# Patient Record
Sex: FEMALE | Race: WHITE | NOT HISPANIC OR LATINO | URBAN - METROPOLITAN AREA
[De-identification: names, ages, dates, MRNs, and addresses within clinical notes are randomized per-mention and may not be internally consistent; named-entity substitution may affect disease eponyms.]

---

## 2017-03-01 ENCOUNTER — EMERGENCY (EMERGENCY)
Facility: HOSPITAL | Age: 82
LOS: 1 days | End: 2017-03-01
Admitting: EMERGENCY MEDICINE
Payer: MEDICARE

## 2017-03-01 LAB
APPEARANCE UR: CLEAR — SIGNIFICANT CHANGE UP
BACTERIA # UR AUTO: ABNORMAL
BILIRUB UR-MCNC: NEGATIVE — SIGNIFICANT CHANGE UP
COD CRY URNS QL: ABNORMAL
COLOR SPEC: YELLOW — SIGNIFICANT CHANGE UP
DIFF PNL FLD: ABNORMAL
EPI CELLS # UR: SIGNIFICANT CHANGE UP
GLUCOSE UR QL: NEGATIVE MG/DL — SIGNIFICANT CHANGE UP
KETONES UR-MCNC: NEGATIVE — SIGNIFICANT CHANGE UP
LEUKOCYTE ESTERASE UR-ACNC: ABNORMAL
NITRITE UR-MCNC: NEGATIVE — SIGNIFICANT CHANGE UP
PH UR: 5 — SIGNIFICANT CHANGE UP (ref 4.8–8)
PROT UR-MCNC: 100 MG/DL
RBC CASTS # UR COMP ASSIST: ABNORMAL /HPF (ref 0–4)
SP GR SPEC: 1.02 — SIGNIFICANT CHANGE UP (ref 1.01–1.02)
UROBILINOGEN FLD QL: 1 MG/DL
WBC UR QL: ABNORMAL

## 2017-03-01 PROCEDURE — 99283 EMERGENCY DEPT VISIT LOW MDM: CPT

## 2017-03-01 PROCEDURE — 81001 URINALYSIS AUTO W/SCOPE: CPT

## 2017-03-01 RX ORDER — PHENAZOPYRIDINE HCL 100 MG
1 TABLET ORAL
Qty: 6 | Refills: 0 | OUTPATIENT
Start: 2017-03-01 | End: 2017-03-03

## 2017-03-01 RX ORDER — MOXIFLOXACIN HYDROCHLORIDE TABLETS, 400 MG 400 MG/1
1 TABLET, FILM COATED ORAL
Qty: 14 | Refills: 0 | OUTPATIENT
Start: 2017-03-01 | End: 2017-03-08

## 2017-06-14 ENCOUNTER — EMERGENCY (EMERGENCY)
Facility: HOSPITAL | Age: 82
LOS: 1 days | End: 2017-06-14
Attending: EMERGENCY MEDICINE | Admitting: EMERGENCY MEDICINE

## 2017-06-14 VITALS
OXYGEN SATURATION: 96 % | DIASTOLIC BLOOD PRESSURE: 65 MMHG | RESPIRATION RATE: 16 BRPM | SYSTOLIC BLOOD PRESSURE: 110 MMHG | TEMPERATURE: 98 F | HEIGHT: 64 IN | HEART RATE: 88 BPM

## 2017-06-14 PROCEDURE — 99283 EMERGENCY DEPT VISIT LOW MDM: CPT

## 2017-06-14 RX ORDER — ACETAMINOPHEN 500 MG
650 TABLET ORAL ONCE
Qty: 0 | Refills: 0 | Status: COMPLETED | OUTPATIENT
Start: 2017-06-14 | End: 2017-06-14

## 2017-06-14 RX ADMIN — Medication 300 MILLIGRAM(S): at 06:20

## 2017-06-14 RX ADMIN — Medication 650 MILLIGRAM(S): at 06:19

## 2017-06-14 NOTE — ED ADULT NURSE NOTE - OBJECTIVE STATEMENT
c/o pain on the chin x2days with  no radiation. denies fever/chills or swallowing difficulty.mild swelling noted.

## 2017-06-14 NOTE — ED PROVIDER NOTE - MEDICAL DECISION MAKING DETAILS
87 y.o. F with 3 days worsening pain in lower teeth/chin - has dental abscess - will start antibiotics - advised must see dentist/oral surgeon today - has dentist though hasn't been in years - will give referrals for on call oral surgeon and [facility]

## 2017-06-14 NOTE — ED PROVIDER NOTE - PMH
Cardiomyopathy    COPD (Chronic Obstructive Pulmonary Disease)    Herniation of Cervical Intervertebral Disc    Pneumonia    Syncope

## 2017-06-14 NOTE — ED PROVIDER NOTE - OBJECTIVE STATEMENT
87 y.o. F having pain in chin, worsening for 3 days, not sure if dental or in gums, took ibuprofen without relief, hasn't been to dentist in years, thinks they would be able to see her today, no fever, no other symptoms

## 2017-06-14 NOTE — ED PROVIDER NOTE - MOUTH
+gingivitis, large amount plaque, abscess in space between anterior chin and mid mandible - tender and swollen/GINGIVAL ABSCESS/GINGIVAL EDEMA

## 2017-06-16 ENCOUNTER — EMERGENCY (EMERGENCY)
Facility: HOSPITAL | Age: 82
LOS: 1 days | Discharge: HOME | End: 2017-06-16
Attending: EMERGENCY MEDICINE | Admitting: EMERGENCY MEDICINE
Payer: MEDICARE

## 2017-06-16 VITALS
HEART RATE: 82 BPM | SYSTOLIC BLOOD PRESSURE: 122 MMHG | RESPIRATION RATE: 16 BRPM | OXYGEN SATURATION: 96 % | DIASTOLIC BLOOD PRESSURE: 60 MMHG | TEMPERATURE: 98 F

## 2017-06-16 VITALS
DIASTOLIC BLOOD PRESSURE: 74 MMHG | OXYGEN SATURATION: 96 % | HEIGHT: 64 IN | HEART RATE: 81 BPM | WEIGHT: 84 LBS | TEMPERATURE: 98 F | RESPIRATION RATE: 16 BRPM | SYSTOLIC BLOOD PRESSURE: 122 MMHG

## 2017-06-16 LAB
ALBUMIN SERPL ELPH-MCNC: 3.5 G/DL — SIGNIFICANT CHANGE UP (ref 3.3–5)
ALP SERPL-CCNC: 122 U/L — HIGH (ref 30–120)
ALT FLD-CCNC: 11 U/L DA — SIGNIFICANT CHANGE UP (ref 10–60)
AMYLASE P1 CFR SERPL: 116 U/L — SIGNIFICANT CHANGE UP (ref 25–125)
ANION GAP SERPL CALC-SCNC: 4 MMOL/L — LOW (ref 5–17)
APPEARANCE UR: CLEAR — SIGNIFICANT CHANGE UP
APTT BLD: 31.1 SEC — SIGNIFICANT CHANGE UP (ref 27.5–37.4)
AST SERPL-CCNC: 17 U/L — SIGNIFICANT CHANGE UP (ref 10–40)
BACTERIA # UR AUTO: ABNORMAL
BASOPHILS # BLD AUTO: 0.2 K/UL — SIGNIFICANT CHANGE UP (ref 0–0.2)
BASOPHILS NFR BLD AUTO: 2.1 % — HIGH (ref 0–2)
BILIRUB SERPL-MCNC: 0.5 MG/DL — SIGNIFICANT CHANGE UP (ref 0.2–1.2)
BILIRUB UR-MCNC: NEGATIVE — SIGNIFICANT CHANGE UP
BUN SERPL-MCNC: 21 MG/DL — SIGNIFICANT CHANGE UP (ref 7–23)
CALCIUM SERPL-MCNC: 9.9 MG/DL — SIGNIFICANT CHANGE UP (ref 8.4–10.5)
CHLORIDE SERPL-SCNC: 105 MMOL/L — SIGNIFICANT CHANGE UP (ref 96–108)
CK MB CFR SERPL CALC: 0.5 NG/ML — SIGNIFICANT CHANGE UP (ref 0–3.6)
CO2 SERPL-SCNC: 34 MMOL/L — HIGH (ref 22–31)
COD CRY URNS QL: ABNORMAL
COLOR SPEC: YELLOW — SIGNIFICANT CHANGE UP
CREAT SERPL-MCNC: 0.84 MG/DL — SIGNIFICANT CHANGE UP (ref 0.5–1.3)
DIFF PNL FLD: ABNORMAL
EOSINOPHIL # BLD AUTO: 0.2 K/UL — SIGNIFICANT CHANGE UP (ref 0–0.5)
EOSINOPHIL NFR BLD AUTO: 2.8 % — SIGNIFICANT CHANGE UP (ref 0–6)
EPI CELLS # UR: SIGNIFICANT CHANGE UP
GLUCOSE SERPL-MCNC: 153 MG/DL — HIGH (ref 70–99)
GLUCOSE UR QL: NEGATIVE MG/DL — SIGNIFICANT CHANGE UP
HCT VFR BLD CALC: 41.3 % — SIGNIFICANT CHANGE UP (ref 34.5–45)
HGB BLD-MCNC: 13.8 G/DL — SIGNIFICANT CHANGE UP (ref 11.5–15.5)
INR BLD: 1.05 RATIO — SIGNIFICANT CHANGE UP (ref 0.88–1.16)
KETONES UR-MCNC: NEGATIVE — SIGNIFICANT CHANGE UP
LEUKOCYTE ESTERASE UR-ACNC: ABNORMAL
LIDOCAIN IGE QN: 191 U/L — SIGNIFICANT CHANGE UP (ref 73–393)
LYMPHOCYTES # BLD AUTO: 1.2 K/UL — SIGNIFICANT CHANGE UP (ref 1–3.3)
LYMPHOCYTES # BLD AUTO: 15.1 % — SIGNIFICANT CHANGE UP (ref 13–44)
MCHC RBC-ENTMCNC: 31 PG — SIGNIFICANT CHANGE UP (ref 27–34)
MCHC RBC-ENTMCNC: 33.5 GM/DL — SIGNIFICANT CHANGE UP (ref 32–36)
MCV RBC AUTO: 92.8 FL — SIGNIFICANT CHANGE UP (ref 80–100)
MONOCYTES # BLD AUTO: 1 K/UL — HIGH (ref 0–0.9)
MONOCYTES NFR BLD AUTO: 11.9 % — SIGNIFICANT CHANGE UP (ref 2–14)
NEUTROPHILS # BLD AUTO: 5.6 K/UL — SIGNIFICANT CHANGE UP (ref 1.8–7.4)
NEUTROPHILS NFR BLD AUTO: 68.1 % — SIGNIFICANT CHANGE UP (ref 43–77)
NITRITE UR-MCNC: POSITIVE
PH UR: 5 — SIGNIFICANT CHANGE UP (ref 5–8)
PLATELET # BLD AUTO: 314 K/UL — SIGNIFICANT CHANGE UP (ref 150–400)
POTASSIUM SERPL-MCNC: 4.6 MMOL/L — SIGNIFICANT CHANGE UP (ref 3.5–5.3)
POTASSIUM SERPL-SCNC: 4.6 MMOL/L — SIGNIFICANT CHANGE UP (ref 3.5–5.3)
PROT SERPL-MCNC: 8.3 G/DL — SIGNIFICANT CHANGE UP (ref 6–8.3)
PROT UR-MCNC: 500 MG/DL
PROTHROM AB SERPL-ACNC: 11.5 SEC — SIGNIFICANT CHANGE UP (ref 9.8–12.7)
RBC # BLD: 4.46 M/UL — SIGNIFICANT CHANGE UP (ref 3.8–5.2)
RBC # FLD: 12.8 % — SIGNIFICANT CHANGE UP (ref 10.3–14.5)
RBC CASTS # UR COMP ASSIST: ABNORMAL /HPF (ref 0–4)
SODIUM SERPL-SCNC: 143 MMOL/L — SIGNIFICANT CHANGE UP (ref 135–145)
SP GR SPEC: 1.03 — HIGH (ref 1.01–1.02)
TROPONIN I SERPL-MCNC: 0 NG/ML — LOW (ref 0.02–0.06)
UROBILINOGEN FLD QL: 1 MG/DL
WBC # BLD: 8.2 K/UL — SIGNIFICANT CHANGE UP (ref 3.8–10.5)
WBC # FLD AUTO: 8.2 K/UL — SIGNIFICANT CHANGE UP (ref 3.8–10.5)
WBC UR QL: SIGNIFICANT CHANGE UP

## 2017-06-16 PROCEDURE — 74020: CPT | Mod: 26

## 2017-06-16 PROCEDURE — 93010 ELECTROCARDIOGRAM REPORT: CPT

## 2017-06-16 PROCEDURE — 71010: CPT | Mod: 26

## 2017-06-16 PROCEDURE — 74176 CT ABD & PELVIS W/O CONTRAST: CPT | Mod: 26

## 2017-06-16 PROCEDURE — 99284 EMERGENCY DEPT VISIT MOD MDM: CPT

## 2017-06-16 RX ORDER — MORPHINE SULFATE 50 MG/1
2 CAPSULE, EXTENDED RELEASE ORAL ONCE
Qty: 0 | Refills: 0 | Status: DISCONTINUED | OUTPATIENT
Start: 2017-06-16 | End: 2017-06-16

## 2017-06-16 RX ORDER — PANTOPRAZOLE SODIUM 20 MG/1
40 TABLET, DELAYED RELEASE ORAL ONCE
Qty: 0 | Refills: 0 | Status: COMPLETED | OUTPATIENT
Start: 2017-06-16 | End: 2017-06-16

## 2017-06-16 RX ORDER — SODIUM CHLORIDE 9 MG/ML
1000 INJECTION INTRAMUSCULAR; INTRAVENOUS; SUBCUTANEOUS ONCE
Qty: 0 | Refills: 0 | Status: COMPLETED | OUTPATIENT
Start: 2017-06-16 | End: 2017-06-16

## 2017-06-16 RX ADMIN — MORPHINE SULFATE 2 MILLIGRAM(S): 50 CAPSULE, EXTENDED RELEASE ORAL at 18:35

## 2017-06-16 RX ADMIN — PANTOPRAZOLE SODIUM 40 MILLIGRAM(S): 20 TABLET, DELAYED RELEASE ORAL at 17:43

## 2017-06-16 RX ADMIN — MORPHINE SULFATE 2 MILLIGRAM(S): 50 CAPSULE, EXTENDED RELEASE ORAL at 20:33

## 2017-06-16 RX ADMIN — MORPHINE SULFATE 2 MILLIGRAM(S): 50 CAPSULE, EXTENDED RELEASE ORAL at 19:04

## 2017-06-16 RX ADMIN — SODIUM CHLORIDE 1000 MILLILITER(S): 9 INJECTION INTRAMUSCULAR; INTRAVENOUS; SUBCUTANEOUS at 17:30

## 2017-06-16 NOTE — ED PROVIDER NOTE - PROGRESS NOTE DETAILS
Pain improved. Hematuria noted on UA. ? renal colic? Plan - ct [place]. Reevaluated patient at bedside.  Patient feeling well, wants to be d/c to f.u as outpt.  Discussed the results of all diagnostic testing in ED and copies of all reports given.   An opportunity to ask questions was given.  Discussed the importance of prompt, close medical follow-up.  Patient will return with any changes, concerns or persistent / worsening symptoms.  Understanding of all instructions verbalized. D/W gallito (uro) he requests d/c to f.u in office

## 2017-06-16 NOTE — ED ADULT NURSE NOTE - ED STAT RN HANDOFF DETAILS
Endorse pt to next shift RN. Awaiting reevaluation and disposition. IVF wide open. Meds given as ordered. Family at the bedside.

## 2017-06-16 NOTE — ED PROVIDER NOTE - MEDICAL DECISION MAKING DETAILS
88 y/o F w/ epigastric pain after taking Clindamycin, questionable gastritis vs. other intraabdominal pathology. Will check blood work, X-rays and possible CT scan.

## 2017-06-16 NOTE — ED PROVIDER NOTE - OBJECTIVE STATEMENT
86 y/o F seen in ER this week for tooth ache and was started on Clindamycin. Pt developed epigastric pain radiating to back this morning. She is unsure if it is from medication or food that she ate. Pt denies fever, cough, vomiting, diarrhea. PMHx of COPD. Pt has not seen an MD in years.

## 2017-06-16 NOTE — ED PROVIDER NOTE - CONDUCTED A DETAILED DISCUSSION WITH PATIENT AND/OR GUARDIAN REGARDING, MDM
lab results/radiology results need for outpatient follow-up/radiology results/return to ED if symptoms worsen, persist or questions arise/lab results

## 2017-06-16 NOTE — ED PROVIDER NOTE - CONSTITUTIONAL, MLM
normal... awake, alert, oriented to person, place, time/situation and in moderate distress. Holding abdomen.

## 2017-06-16 NOTE — ED ADULT NURSE REASSESSMENT NOTE - NS ED NURSE REASSESS COMMENT FT1
Ambulatory to bathroom w/out distress. Currently pain 2-3/10. Comes intermittently per pt. IV D/C'd per MD. Awaiting D/C instructions.

## 2017-06-17 ENCOUNTER — INPATIENT (INPATIENT)
Facility: HOSPITAL | Age: 82
LOS: 1 days | Discharge: HOME | DRG: 392 | End: 2017-06-19
Attending: INTERNAL MEDICINE | Admitting: INTERNAL MEDICINE
Payer: MEDICARE

## 2017-06-17 VITALS
SYSTOLIC BLOOD PRESSURE: 146 MMHG | TEMPERATURE: 98 F | DIASTOLIC BLOOD PRESSURE: 67 MMHG | RESPIRATION RATE: 20 BRPM | HEART RATE: 74 BPM | OXYGEN SATURATION: 96 % | WEIGHT: 91.27 LBS | HEIGHT: 64 IN

## 2017-06-17 DIAGNOSIS — R10.13 EPIGASTRIC PAIN: ICD-10-CM

## 2017-06-17 LAB
ALBUMIN SERPL ELPH-MCNC: 3.1 G/DL — LOW (ref 3.3–5)
ALP SERPL-CCNC: 109 U/L — SIGNIFICANT CHANGE UP (ref 30–120)
ALT FLD-CCNC: 10 U/L DA — SIGNIFICANT CHANGE UP (ref 10–60)
AMYLASE P1 CFR SERPL: 96 U/L — SIGNIFICANT CHANGE UP (ref 25–125)
ANION GAP SERPL CALC-SCNC: 3 MMOL/L — LOW (ref 5–17)
APPEARANCE UR: CLEAR — SIGNIFICANT CHANGE UP
AST SERPL-CCNC: 24 U/L — SIGNIFICANT CHANGE UP (ref 10–40)
BACTERIA # UR AUTO: ABNORMAL
BASOPHILS # BLD AUTO: 0.1 K/UL — SIGNIFICANT CHANGE UP (ref 0–0.2)
BASOPHILS NFR BLD AUTO: 1.3 % — SIGNIFICANT CHANGE UP (ref 0–2)
BILIRUB SERPL-MCNC: 0.6 MG/DL — SIGNIFICANT CHANGE UP (ref 0.2–1.2)
BILIRUB UR-MCNC: NEGATIVE — SIGNIFICANT CHANGE UP
BUN SERPL-MCNC: 17 MG/DL — SIGNIFICANT CHANGE UP (ref 7–23)
CALCIUM SERPL-MCNC: 9.5 MG/DL — SIGNIFICANT CHANGE UP (ref 8.4–10.5)
CHLORIDE SERPL-SCNC: 105 MMOL/L — SIGNIFICANT CHANGE UP (ref 96–108)
CO2 SERPL-SCNC: 33 MMOL/L — HIGH (ref 22–31)
COLOR SPEC: YELLOW — SIGNIFICANT CHANGE UP
CREAT SERPL-MCNC: 0.65 MG/DL — SIGNIFICANT CHANGE UP (ref 0.5–1.3)
DIFF PNL FLD: ABNORMAL
EOSINOPHIL # BLD AUTO: 0.2 K/UL — SIGNIFICANT CHANGE UP (ref 0–0.5)
EOSINOPHIL NFR BLD AUTO: 2.6 % — SIGNIFICANT CHANGE UP (ref 0–6)
EPI CELLS # UR: SIGNIFICANT CHANGE UP
GLUCOSE SERPL-MCNC: 110 MG/DL — HIGH (ref 70–99)
GLUCOSE UR QL: NEGATIVE MG/DL — SIGNIFICANT CHANGE UP
HCT VFR BLD CALC: 40.9 % — SIGNIFICANT CHANGE UP (ref 34.5–45)
HGB BLD-MCNC: 12.8 G/DL — SIGNIFICANT CHANGE UP (ref 11.5–15.5)
KETONES UR-MCNC: NEGATIVE — SIGNIFICANT CHANGE UP
LEUKOCYTE ESTERASE UR-ACNC: ABNORMAL
LIDOCAIN IGE QN: 125 U/L — SIGNIFICANT CHANGE UP (ref 73–393)
LYMPHOCYTES # BLD AUTO: 0.8 K/UL — LOW (ref 1–3.3)
LYMPHOCYTES # BLD AUTO: 9.9 % — LOW (ref 13–44)
MCHC RBC-ENTMCNC: 29.2 PG — SIGNIFICANT CHANGE UP (ref 27–34)
MCHC RBC-ENTMCNC: 31.2 GM/DL — LOW (ref 32–36)
MCV RBC AUTO: 93.7 FL — SIGNIFICANT CHANGE UP (ref 80–100)
MONOCYTES # BLD AUTO: 0.8 K/UL — SIGNIFICANT CHANGE UP (ref 0–0.9)
MONOCYTES NFR BLD AUTO: 9.8 % — SIGNIFICANT CHANGE UP (ref 2–14)
NEUTROPHILS # BLD AUTO: 6.4 K/UL — SIGNIFICANT CHANGE UP (ref 1.8–7.4)
NEUTROPHILS NFR BLD AUTO: 76.3 % — SIGNIFICANT CHANGE UP (ref 43–77)
NITRITE UR-MCNC: NEGATIVE — SIGNIFICANT CHANGE UP
PH UR: 5 — SIGNIFICANT CHANGE UP (ref 5–8)
PLATELET # BLD AUTO: 267 K/UL — SIGNIFICANT CHANGE UP (ref 150–400)
POTASSIUM SERPL-MCNC: 4.4 MMOL/L — SIGNIFICANT CHANGE UP (ref 3.5–5.3)
POTASSIUM SERPL-SCNC: 4.4 MMOL/L — SIGNIFICANT CHANGE UP (ref 3.5–5.3)
PROT SERPL-MCNC: 7.6 G/DL — SIGNIFICANT CHANGE UP (ref 6–8.3)
PROT UR-MCNC: 100 MG/DL
RBC # BLD: 4.37 M/UL — SIGNIFICANT CHANGE UP (ref 3.8–5.2)
RBC # FLD: 13 % — SIGNIFICANT CHANGE UP (ref 10.3–14.5)
RBC CASTS # UR COMP ASSIST: ABNORMAL /HPF (ref 0–4)
SODIUM SERPL-SCNC: 141 MMOL/L — SIGNIFICANT CHANGE UP (ref 135–145)
SP GR SPEC: 1.03 — HIGH (ref 1.01–1.02)
TROPONIN I SERPL-MCNC: 0.01 NG/ML — LOW (ref 0.02–0.06)
UROBILINOGEN FLD QL: NEGATIVE MG/DL — SIGNIFICANT CHANGE UP
WBC # BLD: 8.4 K/UL — SIGNIFICANT CHANGE UP (ref 3.8–10.5)
WBC # FLD AUTO: 8.4 K/UL — SIGNIFICANT CHANGE UP (ref 3.8–10.5)
WBC UR QL: SIGNIFICANT CHANGE UP

## 2017-06-17 PROCEDURE — 76700 US EXAM ABDOM COMPLETE: CPT | Mod: 26

## 2017-06-17 PROCEDURE — 71020: CPT | Mod: 26

## 2017-06-17 PROCEDURE — 93010 ELECTROCARDIOGRAM REPORT: CPT

## 2017-06-17 PROCEDURE — 99223 1ST HOSP IP/OBS HIGH 75: CPT | Mod: AI

## 2017-06-17 RX ORDER — BUDESONIDE AND FORMOTEROL FUMARATE DIHYDRATE 160; 4.5 UG/1; UG/1
2 AEROSOL RESPIRATORY (INHALATION)
Qty: 0 | Refills: 0 | Status: DISCONTINUED | OUTPATIENT
Start: 2017-06-17 | End: 2017-06-19

## 2017-06-17 RX ORDER — ACETAMINOPHEN 500 MG
650 TABLET ORAL EVERY 6 HOURS
Qty: 0 | Refills: 0 | Status: DISCONTINUED | OUTPATIENT
Start: 2017-06-17 | End: 2017-06-19

## 2017-06-17 RX ORDER — MORPHINE SULFATE 50 MG/1
0.5 CAPSULE, EXTENDED RELEASE ORAL
Qty: 0 | Refills: 0 | Status: DISCONTINUED | OUTPATIENT
Start: 2017-06-17 | End: 2017-06-17

## 2017-06-17 RX ORDER — KETOROLAC TROMETHAMINE 30 MG/ML
15 SYRINGE (ML) INJECTION ONCE
Qty: 0 | Refills: 0 | Status: DISCONTINUED | OUTPATIENT
Start: 2017-06-17 | End: 2017-06-17

## 2017-06-17 RX ORDER — MORPHINE SULFATE 50 MG/1
1 CAPSULE, EXTENDED RELEASE ORAL
Qty: 0 | Refills: 0 | Status: DISCONTINUED | OUTPATIENT
Start: 2017-06-17 | End: 2017-06-17

## 2017-06-17 RX ORDER — SODIUM CHLORIDE 9 MG/ML
1000 INJECTION INTRAMUSCULAR; INTRAVENOUS; SUBCUTANEOUS
Qty: 0 | Refills: 0 | Status: DISCONTINUED | OUTPATIENT
Start: 2017-06-17 | End: 2017-06-19

## 2017-06-17 RX ORDER — PANTOPRAZOLE SODIUM 20 MG/1
40 TABLET, DELAYED RELEASE ORAL ONCE
Qty: 0 | Refills: 0 | Status: COMPLETED | OUTPATIENT
Start: 2017-06-17 | End: 2017-06-17

## 2017-06-17 RX ORDER — IPRATROPIUM/ALBUTEROL SULFATE 18-103MCG
3 AEROSOL WITH ADAPTER (GRAM) INHALATION EVERY 6 HOURS
Qty: 0 | Refills: 0 | Status: DISCONTINUED | OUTPATIENT
Start: 2017-06-17 | End: 2017-06-19

## 2017-06-17 RX ORDER — MORPHINE SULFATE 50 MG/1
1 CAPSULE, EXTENDED RELEASE ORAL EVERY 4 HOURS
Qty: 0 | Refills: 0 | Status: DISCONTINUED | OUTPATIENT
Start: 2017-06-17 | End: 2017-06-19

## 2017-06-17 RX ORDER — PANTOPRAZOLE SODIUM 20 MG/1
40 TABLET, DELAYED RELEASE ORAL DAILY
Qty: 0 | Refills: 0 | Status: DISCONTINUED | OUTPATIENT
Start: 2017-06-17 | End: 2017-06-19

## 2017-06-17 RX ORDER — MORPHINE SULFATE 50 MG/1
0.5 CAPSULE, EXTENDED RELEASE ORAL EVERY 4 HOURS
Qty: 0 | Refills: 0 | Status: DISCONTINUED | OUTPATIENT
Start: 2017-06-17 | End: 2017-06-19

## 2017-06-17 RX ORDER — HEPARIN SODIUM 5000 [USP'U]/ML
5000 INJECTION INTRAVENOUS; SUBCUTANEOUS EVERY 12 HOURS
Qty: 0 | Refills: 0 | Status: DISCONTINUED | OUTPATIENT
Start: 2017-06-17 | End: 2017-06-19

## 2017-06-17 RX ADMIN — Medication 15 MILLIGRAM(S): at 18:21

## 2017-06-17 RX ADMIN — Medication 15 MILLIGRAM(S): at 19:18

## 2017-06-17 NOTE — H&P ADULT - PROBLEM SELECTOR PLAN 3
Stable, on Brovana which is non formulary, will continue on equivalent doses of  Symbicort in addition to PRN Duoneb Q 6h.

## 2017-06-17 NOTE — ED PROVIDER NOTE - OBJECTIVE STATEMENT
88 y/o F pt with history of cardiomyopathy, COPD, PNA, syncope presents to the ED c/o abdominal pain. 86 y/o F pt with history of cardiomyopathy, COPD, PNA, syncope presents to the ED c/o intermittent abdominal pain for the past 3 days. Pt went to PCP and was diagnosed with nephrolithiasis and cholelithiasis. Denies nausea, vomiting, diarrhea, fever, dysuria. No further complaints at this time.   PCP: Dr. Burogs 88 y/o F pt with history of cardiomyopathy, COPD, PNA, syncope presents to the ED c/o intermittent abdominal pain for the past 3 days. Pt was in ED yesterday and was diagnosed with nephrolithiasis and cholelithiasis, and was discharged, but the pain has been worsening. Denies nausea, vomiting, diarrhea, fever, dysuria. No further complaints at this time.   PCP: Dr. Burgos

## 2017-06-17 NOTE — H&P ADULT - PROBLEM SELECTOR PLAN 2
on clindamycin, no fever, normal white count, no signs or symptoms suggestive of sepsis, will continue Clindamycin.

## 2017-06-17 NOTE — H&P ADULT - HISTORY OF PRESENT ILLNESS
This is an 86 y/o F with PMH of COPD presented with severe 10/10 non radiating dull aching epigastric pain started yesterday & was on & off the whole day, not related to effort, breathing, or change s in body position, not associated with nausea, vomiting, or diarrhea, no dysuria, and no blood in stool or in urine. She was seen at [place] ED , discharged home around 11 pm and she was ok, wake up fine today, but immediately started to experience the same pain again after having a small breakfast. Patient was originally seen yesterday am at the ED for dental abscess, was started on Clindamycin, and she was using Ibuprofen as needed even on an empty stomach. Denies any chest pain, cough, SOB, palpitations, dizziness, fever, or chills.

## 2017-06-17 NOTE — H&P ADULT - NSHPLABSRESULTS_GEN_ALL_CORE
141  |  105  |  17  ----------------------------<  110<H>  4.4   |  33<H>  |  0.65    Ca    9.5      2017 18:25    TPro  7.6  /  Alb  3.1<L>  /  TBili  0.6  /  DBili  x   /  AST  24  /  ALT  10  /  AlkPhos  109                            12.8   8.4   )-----------( 267      ( 2017 18:25 )             40.9     CARDIAC MARKERS ( 2017 21:08 )  .011 ng/mL / x     / x     / x     / x      CARDIAC MARKERS ( 2017 17:25 )  .000 ng/mL / x     / x     / x     / 0.5 ng/mL      LIVER FUNCTIONS - ( 2017 18:25 )  Alb: 3.1 g/dL / Pro: 7.6 g/dL / ALK PHOS: 109 U/L / ALT: 10 U/L DA / AST: 24 U/L / GGT: x           PT/INR - ( 2017 17:25 )   PT: 11.5 sec;   INR: 1.05 ratio         PTT - ( 2017 17:25 )  PTT:31.1 sec  Urinalysis Basic - ( 2017 18:25 )    Color: Yellow / Appearance: Clear / S.030 / pH: x  Gluc: x / Ketone: Negative  / Bili: Negative / Urobili: Negative mg/dL   Blood: x / Protein: 100 mg/dL / Nitrite: Negative   Leuk Esterase: Trace / RBC: 25-50 /HPF / WBC 3-5   Sq Epi: x / Non Sq Epi: Few / Bacteria: Few          Sonography of the abdomen.   Liver: Within normal limits.    Bile ducts: Normal caliber. Common hepatic duct measures 3 mm.     Gallbladder: Multiple mobile gallstones are noted. Normal gallbladder   wall thickness at 3 mm.  Sonographic Moreno sign is negative.     Pancreas: Visualized portions are within normal limits.    Spleen: 9 cm. Within normal limits.    Right kidney: 10 cm. nephrolithiasis measuring 12 mm in the interpolar   region and 8 mm in the lower pole. No hydronephrosis.    Left kidney: 10 cm. No hydronephrosis.     Ascites: None.    Aorta and IVC: Visualized portions are within normal limits apart from   mild aortic plaque.          CT of the Abdomen and Pelvis without intravenous contrast.    LOWER CHEST: Scattered right basilar tree-in-bud nodules, likely   infectious.  ABDOMEN:  LIVER: within normal limits  BILE DUCTS: normal caliber  GALLBLADDER: Cholelithiasis  PANCREAS: within normal limits  SPLEEN: within normal limits  ADRENALS: Bilateral calcifications  KIDNEYS: 9 mm calculus within the right renal pelvis with minimal   hydronephrosis. Multiple nonobstructing right intrarenal calculi up to 5   mm at the lower pole.  PELVIS:  REPRODUCTIVE ORGANS: no pelvic masses, hysterectomy  BLADDER: within normal limits    BOWEL: Multifocal diverticulosis.  PERITONEUM: no ascitesor free air, no fluid collection  RETROPERITONEUM: no enlarged retroperitoneal or pelvic nodes.    VESSELS: Vascular calcifications.  ABDOMINAL WALL: within normal limits.  MUSCULOSKELETAL: Pagets changes in the pelvis, osteopenia.        CXR   As per my review shows normal cardiac shadow size, hyperinflated lung B/L, haziness of right upper lobe possibly atelectasis, prominent pulm vessels B/L, no pulmonary infiltrates, pleural effusion, or pneumothorax. Pending official report.    EKG   As per my review shows SR at 90/min, normal AL & QTc intervals, AL segment depression, normal QRS voltage, duration, and axis (+60), with normal transition, incomplete RBBB with 2ry repolarization abnormality, LAE, no ST-T abnormality.

## 2017-06-17 NOTE — ED ADULT NURSE NOTE - OBJECTIVE STATEMENT
pt c/o epigastric pains worse with movement and palpitation pt seen in ed 2 times this week and did not want ot take pain meds wanted to come to hospital and see md instead

## 2017-06-17 NOTE — H&P ADULT - NSHPPHYSICALEXAM_GEN_ALL_CORE
Vital Signs Last 24 Hrs  T(C): 36.7, Max: 36.7 (06-17 @ 16:43)  T(F): 98, Max: 98 (06-17 @ 16:43)  HR: 71 (71 - 74)  BP: 127/71 (127/71 - 146/67)  BP(mean): --  RR: 18 (18 - 20)  SpO2: 99% (96% - 99%)      PHYSICAL EXAM:    GENERAL: NAD, well-groomed, well-developed.  HEAD:  Atraumatic, Normocephalic.  EYES: PERRLA, conjunctiva clear.  ENMT: no nasal discharge, no nacho-pharyngeal erythema or exudates, MMM.   NECK: Supple, No JVD.  NERVOUS SYSTEM:  Alert & oriented X3, neurologically intact grossly.  CHEST/LUNG: Fair air entry, equal B/L, (+) inspiratory coarse rales B/L lower & middle lung zones, no rhonchi, or wheezing.  HEART: normal S1 & acc S2, no murmurs,  (+) aortic ejection click, no S3 or S4.  ABDOMEN: Soft, non tender, non distended; bowel sounds present, no palpable masses or organomegaly, negative Moreno's sign.  EXTREMITIES:  No clubbing, cyanosis, (+) soft pitting ankle edema.  VASCULAR: 2+ radial & carotid pulses B/L.  SKIN: No rashes or lesions.  PSYCH: normal affect & behavior. -  Vital Signs Last 24 Hrs  T(C): 36.7, Max: 36.7 (06-17 @ 16:43)  T(F): 98, Max: 98 (06-17 @ 16:43)  HR: 71 (71 - 74)  BP: 127/71 (127/71 - 146/67)  BP(mean): --  RR: 18 (18 - 20)  SpO2: 99% (96% - 99%)      PHYSICAL EXAM:    GENERAL: NAD, well-groomed, well-developed.  HEAD:  Atraumatic, Normocephalic.  EYES: PERRLA, conjunctiva clear.  ENMT: no nasal discharge, no nacho-pharyngeal erythema or exudates, MMM.   NECK: Supple, No JVD.  NERVOUS SYSTEM:  Alert & oriented X3, neurologically intact grossly.  CHEST/LUNG: Fair air entry, equal B/L, (+) inspiratory coarse rales B/L lower & middle lung zones, no rhonchi, or wheezing.  HEART: normal S1 & acc S2, no murmurs,  (+) aortic ejection click, no S3 or S4.  ABDOMEN: Soft, non tender, non distended; bowel sounds present, no palpable masses or organomegaly, negative Moreno's sign.  EXTREMITIES:  No clubbing, cyanosis, (+) soft pitting ankle edema.  VASCULAR: 2+ radial & carotid pulses B/L.  SKIN: No rashes or lesions.  PSYCH: normal affect & behavior.

## 2017-06-17 NOTE — H&P ADULT - PROBLEM SELECTOR PLAN 1
Possible acute gastritis/ PUD given the current use of NSAIDs for tooth pain even on an empty stomach, no nausea or vomiting, pain is on & off, intractable, 2nd ED visit for the same pain in less than 24 hours, Sonogram & CT showed cholelithiasis & nephrolithiasis, no CBD stones, normal Lipase & Amylase levels, normal Troponin, no EKG abnormality suggestive of myocardial ischemia, admitted to medicine, pain control, started on Protonix, Maalox PRN, GI consult with Dr. Nieves was called, NPO after midnight for possible EGD in am.

## 2017-06-17 NOTE — ED ADULT NURSE NOTE - CHPI ED SYMPTOMS NEG
no chills/no dizziness/no vomiting/no nausea/no numbness/no fever/no weakness/no decreased eating/drinking/no tingling

## 2017-06-17 NOTE — ED ADULT TRIAGE NOTE - CHIEF COMPLAINT QUOTE
"I'm still having abdominal pains" was seen here yesterday diagnosed with kidney stones, also seen here 4 days ago for tooth abscess

## 2017-06-17 NOTE — H&P ADULT - PROBLEM SELECTOR PLAN 4
Patient is at high risk for VTE, will start her on UFH 5000 units sub Q every 12 hours for DVT prophylaxis.

## 2017-06-17 NOTE — H&P ADULT - NSHPREVIEWOFSYSTEMS_GEN_ALL_CORE
-  CONSTITUTIONAL: No fever, weight loss, or fatigue.  EYES: No eye pain, visual disturbances, or discharge.  ENMT:  No difficulty hearing, tinnitus, vertigo; No sinus or throat pain.  NECK: No pain or stiffness.  RESPIRATORY: No cough, wheezing, or hemoptysis; No shortness of breath.  CARDIOVASCULAR: No chest pain, palpitations, dizziness, or leg swelling.  GASTROINTESTINAL: (+) epigastric pain. No nausea, vomiting, or hematemesis; No diarrhea or Change in bowel habits. No melena or hematochezia.  GENITOURINARY: No dysuria, frequency, hematuria, or incontinence.  NEUROLOGICAL: No headaches, focal muscle weakness, numbness, or tremors.  SKIN: No itching, burning or rashes.  MUSCULOSKELETAL: No muscle or back pain.  PSYCHIATRIC: No depression, anxiety, or agitation.  HEME/LYMPH: No easy bruising, bleeding gums, or nose bleed.  ALLERGY AND IMMUNOLOGIC: No hives or eczema.

## 2017-06-17 NOTE — ED PROVIDER NOTE - MEDICAL DECISION MAKING DETAILS
Pt has continued epigastric pain for the past 3 days and returns today for further treatment and evaluation. Pt will be admitted to Dr. Rollins

## 2017-06-17 NOTE — ED ADULT NURSE REASSESSMENT NOTE - NS ED NURSE REASSESS COMMENT FT1
pt returned from xray and states pains better initially then relative stated no you just had pains pt states intermittent and maybe not as bad pt pending labs and dispot

## 2017-06-18 ENCOUNTER — TRANSCRIPTION ENCOUNTER (OUTPATIENT)
Age: 82
End: 2017-06-18

## 2017-06-18 DIAGNOSIS — R10.13 EPIGASTRIC PAIN: ICD-10-CM

## 2017-06-18 DIAGNOSIS — J44.9 CHRONIC OBSTRUCTIVE PULMONARY DISEASE, UNSPECIFIED: ICD-10-CM

## 2017-06-18 DIAGNOSIS — N20.0 CALCULUS OF KIDNEY: ICD-10-CM

## 2017-06-18 DIAGNOSIS — K04.7 PERIAPICAL ABSCESS WITHOUT SINUS: ICD-10-CM

## 2017-06-18 DIAGNOSIS — Z29.9 ENCOUNTER FOR PROPHYLACTIC MEASURES, UNSPECIFIED: ICD-10-CM

## 2017-06-18 DIAGNOSIS — K80.20 CALCULUS OF GALLBLADDER WITHOUT CHOLECYSTITIS WITHOUT OBSTRUCTION: ICD-10-CM

## 2017-06-18 LAB
ANION GAP SERPL CALC-SCNC: 5 MMOL/L — SIGNIFICANT CHANGE UP (ref 5–17)
BUN SERPL-MCNC: 17 MG/DL — SIGNIFICANT CHANGE UP (ref 7–23)
CALCIUM SERPL-MCNC: 8.7 MG/DL — SIGNIFICANT CHANGE UP (ref 8.4–10.5)
CHLORIDE SERPL-SCNC: 109 MMOL/L — HIGH (ref 96–108)
CO2 SERPL-SCNC: 29 MMOL/L — SIGNIFICANT CHANGE UP (ref 22–31)
CREAT SERPL-MCNC: 0.68 MG/DL — SIGNIFICANT CHANGE UP (ref 0.5–1.3)
GLUCOSE SERPL-MCNC: 92 MG/DL — SIGNIFICANT CHANGE UP (ref 70–99)
HCT VFR BLD CALC: 34.9 % — SIGNIFICANT CHANGE UP (ref 34.5–45)
HGB BLD-MCNC: 12.1 G/DL — SIGNIFICANT CHANGE UP (ref 11.5–15.5)
MCHC RBC-ENTMCNC: 31.5 PG — SIGNIFICANT CHANGE UP (ref 27–34)
MCHC RBC-ENTMCNC: 34.7 GM/DL — SIGNIFICANT CHANGE UP (ref 32–36)
MCV RBC AUTO: 90.6 FL — SIGNIFICANT CHANGE UP (ref 80–100)
PLATELET # BLD AUTO: 251 K/UL — SIGNIFICANT CHANGE UP (ref 150–400)
POTASSIUM SERPL-MCNC: 3.9 MMOL/L — SIGNIFICANT CHANGE UP (ref 3.5–5.3)
POTASSIUM SERPL-SCNC: 3.9 MMOL/L — SIGNIFICANT CHANGE UP (ref 3.5–5.3)
RBC # BLD: 3.85 M/UL — SIGNIFICANT CHANGE UP (ref 3.8–5.2)
RBC # FLD: 12.8 % — SIGNIFICANT CHANGE UP (ref 10.3–14.5)
SODIUM SERPL-SCNC: 143 MMOL/L — SIGNIFICANT CHANGE UP (ref 135–145)
WBC # BLD: 5.2 K/UL — SIGNIFICANT CHANGE UP (ref 3.8–10.5)
WBC # FLD AUTO: 5.2 K/UL — SIGNIFICANT CHANGE UP (ref 3.8–10.5)

## 2017-06-18 PROCEDURE — 99233 SBSQ HOSP IP/OBS HIGH 50: CPT

## 2017-06-18 RX ORDER — PANTOPRAZOLE SODIUM 20 MG/1
1 TABLET, DELAYED RELEASE ORAL
Qty: 60 | Refills: 0 | OUTPATIENT
Start: 2017-06-18 | End: 2017-07-18

## 2017-06-18 RX ADMIN — HEPARIN SODIUM 5000 UNIT(S): 5000 INJECTION INTRAVENOUS; SUBCUTANEOUS at 17:19

## 2017-06-18 RX ADMIN — Medication 300 MILLIGRAM(S): at 17:20

## 2017-06-18 RX ADMIN — Medication 300 MILLIGRAM(S): at 06:22

## 2017-06-18 RX ADMIN — PANTOPRAZOLE SODIUM 40 MILLIGRAM(S): 20 TABLET, DELAYED RELEASE ORAL at 12:53

## 2017-06-18 RX ADMIN — Medication 300 MILLIGRAM(S): at 23:07

## 2017-06-18 RX ADMIN — Medication 300 MILLIGRAM(S): at 12:53

## 2017-06-18 RX ADMIN — HEPARIN SODIUM 5000 UNIT(S): 5000 INJECTION INTRAVENOUS; SUBCUTANEOUS at 06:21

## 2017-06-18 RX ADMIN — BUDESONIDE AND FORMOTEROL FUMARATE DIHYDRATE 2 PUFF(S): 160; 4.5 AEROSOL RESPIRATORY (INHALATION) at 06:22

## 2017-06-18 RX ADMIN — BUDESONIDE AND FORMOTEROL FUMARATE DIHYDRATE 2 PUFF(S): 160; 4.5 AEROSOL RESPIRATORY (INHALATION) at 17:20

## 2017-06-18 NOTE — PROGRESS NOTE ADULT - SUBJECTIVE AND OBJECTIVE BOX
Patient is a 87y old  Female who presents with a chief complaint of Severe epigastric pain. (2017 11:12)      INTERVAL HPI/OVERNIGHT EVENTS: pt feels better, no more abdominal pain, she decided    MEDICATIONS  (STANDING):  heparin  Injectable 5000Unit(s) SubCutaneous every 12 hours  pantoprazole    Tablet 40milliGRAM(s) Oral daily  sodium chloride 0.9%. 1000milliLiter(s) IV Continuous <Continuous>  buDESOnide 160 MICROgram(s)/formoterol 4.5 MICROgram(s) Inhaler 2Puff(s) Inhalation two times a day  clindamycin   Capsule 300milliGRAM(s) Oral every 6 hours    MEDICATIONS  (PRN):  acetaminophen   Tablet. 650milliGRAM(s) Oral every 6 hours PRN Mild Pain (1 - 3)  morphine  - Injectable 1milliGRAM(s) IV Push every 4 hours PRN Severe Pain (7 - 10)  morphine  - Injectable 0.5milliGRAM(s) IV Push every 4 hours PRN Moderate Pain (4 - 6)  aluminum hydroxide/magnesium hydroxide/simethicone Suspension 30milliLiter(s) Oral every 4 hours PRN Dyspepsia  ALBUTerol/ipratropium for Nebulization 3milliLiter(s) Nebulizer every 6 hours PRN Shortness of Breath and/or Wheezing      Allergies    ceftriaxone (Rash)  Rocephin (Hives)  sulfamethoxazole (Fever)  tetracycline (Unknown)    Intolerances        REVIEW OF SYSTEMS:  CONSTITUTIONAL: No fever, weight loss, or fatigue  EYES: No eye pain, visual disturbances, or discharge  ENMT:  No difficulty hearing, tinnitus, vertigo; No sinus or throat pain  NECK: No pain or stiffness  BREASTS: No pain, masses, or nipple discharge  RESPIRATORY: No cough, wheezing, chills or hemoptysis; No shortness of breath  CARDIOVASCULAR: No chest pain, palpitations, dizziness, or leg swelling  GASTROINTESTINAL: No abdominal or epigastric pain. No nausea, vomiting, or hematemesis; No diarrhea or constipation. No melena or hematochezia.  GENITOURINARY: No dysuria, frequency, hematuria, or incontinence  NEUROLOGICAL: No headaches, memory loss, loss of strength, numbness, or tremors  SKIN: No itching, burning, rashes, or lesions   LYMPH NODES: No enlarged glands  ENDOCRINE: No heat or cold intolerance; No hair loss; No polydipsia or polyuria  MUSCULOSKELETAL: No joint pain or swelling; No muscle, back, or extremity pain  PSYCHIATRIC: No depression, anxiety, mood swings, or difficulty sleeping  HEME/LYMPH: No easy bruising, or bleeding gums  ALLERGY AND IMMUNOLOGIC: No hives or eczema    Vital Signs Last 24 Hrs  T(C): 36.7, Max: 36.7 ( @ 16:43)  T(F): 98.1, Max: 98.1 ( @ 00:15)  HR: 82 (71 - 100)  BP: 131/60 (127/71 - 149/67)  BP(mean): --  RR: 16 (16 - 20)  SpO2: 95% (95% - 99%)    PHYSICAL EXAM:  GENERAL: NAD, well-groomed, well-developed  HEAD:  Atraumatic, Normocephalic  EYES: EOMI, PERRLA, conjunctiva and sclera clear  ENMT: No tonsillar erythema, exudates, or enlargement; Moist mucous membranes, Good dentition, No lesions  NECK: Supple, No JVD, Normal thyroid  NERVOUS SYSTEM:  Alert & Oriented X3, Good concentration; Motor Strength 5/5 B/L upper and lower extremities; DTRs 2+ intact and symmetric  CHEST/LUNG: Clear to auscultation bilaterally; No rales, rhonchi, wheezing, or rubs  HEART: Regular rate and rhythm; No murmurs, rubs, or gallops  ABDOMEN: Soft, Nontender, Nondistended; Bowel sounds present  EXTREMITIES:  2+ Peripheral Pulses, No clubbing, cyanosis, or edema  LYMPH: No lymphadenopathy noted  SKIN: No rashes or lesions    LABS:                        12.1   5.2   )-----------( 251      ( 2017 06:10 )             34.9     2017 06:10    143    |  109    |  17     ----------------------------<  92     3.9     |  29     |  0.68     Ca    8.7        2017 06:10    TPro  7.6    /  Alb  3.1    /  TBili  0.6    /  DBili  x      /  AST  24     /  ALT  10     /  AlkPhos  109    2017 18:25    PT/INR - ( 2017 17:25 )   PT: 11.5 sec;   INR: 1.05 ratio         PTT - ( 2017 17:25 )  PTT:31.1 sec  Urinalysis Basic - ( 2017 18:25 )    Color: Yellow / Appearance: Clear / S.030 / pH: x  Gluc: x / Ketone: Negative  / Bili: Negative / Urobili: Negative mg/dL   Blood: x / Protein: 100 mg/dL / Nitrite: Negative   Leuk Esterase: Trace / RBC: 25-50 /HPF / WBC 3-5   Sq Epi: x / Non Sq Epi: Few / Bacteria: Few      CAPILLARY BLOOD GLUCOSE      RADIOLOGY & ADDITIONAL TESTS:    Imaging Personally Reviewed:  [ ] YES  [ ] NO    Consultant(s) Notes Reviewed:  [ ] YES  [ ] NO    Care Discussed with Consultants/Other Providers [ ] YES  [ ] NO Patient is a 87y old  Female who presents with a chief complaint of Severe epigastric pain. (2017 11:12)      INTERVAL HPI/OVERNIGHT EVENTS: pt feels better, no more abdominal pain, she decided to leave AMA, I spoke to her and family, after a while she changed mind and decided to stay. GI team already notified to see her to day.     MEDICATIONS  (STANDING):  heparin  Injectable 5000Unit(s) SubCutaneous every 12 hours  pantoprazole    Tablet 40milliGRAM(s) Oral daily  sodium chloride 0.9%. 1000milliLiter(s) IV Continuous <Continuous>  buDESOnide 160 MICROgram(s)/formoterol 4.5 MICROgram(s) Inhaler 2Puff(s) Inhalation two times a day  clindamycin   Capsule 300milliGRAM(s) Oral every 6 hours    MEDICATIONS  (PRN):  acetaminophen   Tablet. 650milliGRAM(s) Oral every 6 hours PRN Mild Pain (1 - 3)  morphine  - Injectable 1milliGRAM(s) IV Push every 4 hours PRN Severe Pain (7 - 10)  morphine  - Injectable 0.5milliGRAM(s) IV Push every 4 hours PRN Moderate Pain (4 - 6)  aluminum hydroxide/magnesium hydroxide/simethicone Suspension 30milliLiter(s) Oral every 4 hours PRN Dyspepsia  ALBUTerol/ipratropium for Nebulization 3milliLiter(s) Nebulizer every 6 hours PRN Shortness of Breath and/or Wheezing      Allergies    ceftriaxone (Rash)  Rocephin (Hives)  sulfamethoxazole (Fever)  tetracycline (Unknown)    Intolerances        REVIEW OF SYSTEMS:  CONSTITUTIONAL: No fever, weight loss, or fatigue  EYES: No eye pain, visual disturbances, or discharge  ENMT:  No difficulty hearing, tinnitus, vertigo; No sinus or throat pain  NECK: No pain or stiffness  BREASTS: No pain, masses, or nipple discharge  RESPIRATORY: No cough, wheezing, chills or hemoptysis; No shortness of breath  CARDIOVASCULAR: No chest pain, palpitations, dizziness, or leg swelling  GASTROINTESTINAL: No abdominal or epigastric pain. No nausea, vomiting, or hematemesis; No diarrhea or constipation. No melena or hematochezia.  GENITOURINARY: No dysuria, frequency, hematuria, or incontinence  NEUROLOGICAL: No headaches, memory loss, loss of strength, numbness, or tremors  SKIN: No itching, burning, rashes, or lesions   LYMPH NODES: No enlarged glands  ENDOCRINE: No heat or cold intolerance; No hair loss; No polydipsia or polyuria  MUSCULOSKELETAL: No joint pain or swelling; No muscle, back, or extremity pain  PSYCHIATRIC: No depression, anxiety, mood swings, or difficulty sleeping  HEME/LYMPH: No easy bruising, or bleeding gums  ALLERGY AND IMMUNOLOGIC: No hives or eczema    Vital Signs Last 24 Hrs  T(C): 36.7, Max: 36.7 ( @ 16:43)  T(F): 98.1, Max: 98.1 ( @ 00:15)  HR: 82 (71 - 100)  BP: 131/60 (127/71 - 149/67)  BP(mean): --  RR: 16 (16 - 20)  SpO2: 95% (95% - 99%)    PHYSICAL EXAM:  GENERAL: NAD, well-groomed, well-developed  HEAD:  Atraumatic, Normocephalic  EYES: EOMI, PERRLA, conjunctiva and sclera clear  ENMT: No tonsillar erythema, exudates, or enlargement; Moist mucous membranes, Good dentition, No lesions  NECK: Supple, No JVD, Normal thyroid  NERVOUS SYSTEM:  Alert & Oriented X3, Good concentration; Motor Strength 5/5 B/L upper and lower extremities; DTRs 2+ intact and symmetric  CHEST/LUNG: Clear to auscultation bilaterally; No rales, rhonchi, wheezing, or rubs  HEART: Regular rate and rhythm; No murmurs, rubs, or gallops  ABDOMEN: Soft, Nontender, Nondistended; Bowel sounds present  EXTREMITIES:  2+ Peripheral Pulses, No clubbing, cyanosis, or edema  LYMPH: No lymphadenopathy noted  SKIN: No rashes or lesions    LABS:                        12.1   5.2   )-----------( 251      ( 2017 06:10 )             34.9     2017 06:10    143    |  109    |  17     ----------------------------<  92     3.9     |  29     |  0.68     Ca    8.7        2017 06:10    TPro  7.6    /  Alb  3.1    /  TBili  0.6    /  DBili  x      /  AST  24     /  ALT  10     /  AlkPhos  109    2017 18:25    PT/INR - ( 2017 17:25 )   PT: 11.5 sec;   INR: 1.05 ratio         PTT - ( 2017 17:25 )  PTT:31.1 sec  Urinalysis Basic - ( 2017 18:25 )    Color: Yellow / Appearance: Clear / S.030 / pH: x  Gluc: x / Ketone: Negative  / Bili: Negative / Urobili: Negative mg/dL   Blood: x / Protein: 100 mg/dL / Nitrite: Negative   Leuk Esterase: Trace / RBC: 25-50 /HPF / WBC 3-5   Sq Epi: x / Non Sq Epi: Few / Bacteria: Few      CAPILLARY BLOOD GLUCOSE      RADIOLOGY & ADDITIONAL TESTS:    Imaging Personally Reviewed:  [ ] YES  [ ] NO    Consultant(s) Notes Reviewed:  [x ] YES  [ ] NO    Care Discussed with Consultants/Other Providers [x ] YES  [ ] NO

## 2017-06-18 NOTE — DISCHARGE NOTE ADULT - HOSPITAL COURSE
This is an 88 y/o F with PMH of COPD presented with severe 10/10 non radiating dull aching epigastric pain started yesterday & was on & off the whole day, not related to effort, breathing, or change s in body position, not associated with nausea, vomiting, or diarrhea, no dysuria, and no blood in stool or in urine. She was seen at [place] ED , discharged home around 11 pm and she was ok, wake up fine today, but immediately started to experience the same pain again after having a small breakfast. Patient was originally seen yesterday am at the ED for dental abscess, was started on Clindamycin, and she was using Ibuprofen as needed even on an empty stomach. Denies any chest pain, cough, SOB, palpitations, dizziness, fever, or chills.    Pt feels better , no more pain, cont protonix, pt is AO times 3, doesn want to wait to see This is an 88 y/o F with PMH of COPD presented with severe 10/10 non radiating dull aching epigastric pain started yesterday & was on & off the whole day, not related to effort, breathing, or change s in body position, not associated with nausea, vomiting, or diarrhea, no dysuria, and no blood in stool or in urine. She was seen at [place] ED , discharged home around 11 pm and she was ok, wake up fine today, but immediately started to experience the same pain again after having a small breakfast. Patient was originally seen yesterday am at the ED for dental abscess, was started on Clindamycin, and she was using Ibuprofen as needed even on an empty stomach. Denies any chest pain, cough, SOB, palpitations, dizziness, fever, or chills.    Her abdominal pain which could be due to constipation, IBS or PUD resolved, Hb stable no GI bleed, seen by Dr alston, recs cont laxative for constipation or IBS, take protonix for  1 month, f/u with him if pain returns, for possible EGD.   I spent 40 min for this discharge and spoke to Dr Alston,   pt does not hae any active pcp now, the family will find one soon.

## 2017-06-18 NOTE — DISCHARGE NOTE ADULT - PLAN OF CARE
may due to constipation or IBS, or PUD, cont protonix daily and take laxative. if pain returns, f/u with Dr Ching for EGD. as above cont clindamycin and take probiotic twice a day.

## 2017-06-18 NOTE — DISCHARGE NOTE ADULT - ADDITIONAL INSTRUCTIONS
f/u with pcp and gastroentrologist on Monday f/u with pcp and gastroentrologist Dr Ching as needed.   f/u with pcp tai Ferraro tomorrow.

## 2017-06-18 NOTE — DISCHARGE NOTE ADULT - CARE PROVIDER_API CALL
pcp,   Phone: (   )    -  Fax: (   )    -    Jeffery Sanders), Gastroenterology  [address] [place] [address]  Phone: [phone]  Fax: [phone] Jeffery Sanders), Gastroenterology  [address] [facility] [address]  [place] [address]  Phone: [phone]  Fax: [phone]    tai conti  Phone: (   )    -  Fax: (   )    -

## 2017-06-18 NOTE — DISCHARGE NOTE ADULT - PATIENT PORTAL LINK FT
“You can access the [facility] Patient Portal, offered by [facility], by registering with the following website: [email or web address]”

## 2017-06-18 NOTE — DISCHARGE NOTE ADULT - MEDICATION SUMMARY - MEDICATIONS TO TAKE
I will START or STAY ON the medications listed below when I get home from the hospital:    Brovana 15 mcg/2 mL inhalation solution  -- 2 milliliter(s) inhaled 2 times a day  -- Indication: For home meds    docusate sodium 100 mg oral capsule  -- 1 cap(s) by mouth 2 times a day  -- Indication: For COnstipation    senna oral tablet  -- 1 tab(s) by mouth once a day (at bedtime), As Needed  -- Indication: For COnstipation    clindamycin 300 mg oral capsule  -- 1 cap(s) by mouth every 6 hours  -- Finish all this medication unless otherwise directed by prescriber.  Medication should be taken with plenty of water.    -- Indication: For Dental abscess    pantoprazole 40 mg oral delayed release tablet  -- 1 tab(s) by mouth once a day with empty stomach  pt needs once a day protonix , please cancle the prevvious Eprescription which was sent.  -- Indication: For Epigastric pain

## 2017-06-18 NOTE — DIETITIAN INITIAL EVALUATION ADULT. - OTHER INFO
Pt admitted with severe epigastric pain. hx; COPD,cardiomyopathy  Abdominal US suggests cholelithasis: Pt denies change in appetite/weight PTA. Pt appears very thin, cachetic looking but she states she has always been thin but states that people have told her she looks like she has lost weight. Pt was NPO but diet was just advanced to full liquids by LATONIA ADAME: Pt/family requesting  regular food-states she ate a sandwich in the ER. Explained that GI MD ordered full liquids at this time and that it cannot be changed. Per RN,  will see pt later today. Advised pt to speak with him about diet order. Pt admitted with severe epigastric pain. hx; COPD,cardiomyopathy. Pt orginally went to ER on 6/17 and had  Abdominal US which suggested cholelithasis: Pt then went home and came back to the ER samanat night with same complaint: Pt denies change in appetite/weight PTA. Pt appears very thin, cachetic looking but she states she has always been thin but states that people have told her she looks like she has lost weight. Pt was NPO but diet was just advanced to full liquids by GI MD: Pt/family requesting  regular food-states she ate a sandwich in the ER. Explained that GI MD ordered full liquids at this time and that it cannot be changed. Per RN,  will see pt later today. Advised pt to speak with him about diet order. Given pt's underweight status would recommend supplement ensure TID

## 2017-06-18 NOTE — PROGRESS NOTE ADULT - PROBLEM SELECTOR PLAN 2
on clindamycin, no fever, normal white count, no signs or symptoms suggestive of sepsis, will continue Clindamycin. on clindamycin, no fever, normal white count, no signs or symptoms suggestive of sepsis, will continue Clindamycin. will add Bacid.

## 2017-06-18 NOTE — PROGRESS NOTE ADULT - PROBLEM SELECTOR PLAN 1
Possible acute gastritis/ PUD given the current use of NSAIDs for tooth pain even on an empty stomach, no nausea or vomiting, pain is on & off, intractable, 2nd ED visit for the same pain in less than 24 hours, Sonogram & CT showed cholelithiasis & nephrolithiasis, no CBD stones, normal Lipase & Amylase levels, normal Troponin, no EKG abnormality suggestive of myocardial ischemia, admitted to medicine, pain control, started on Protonix, Maalox PRN, GI consult with Dr. Nieves was called, NPO after midnight for possible EGD in am. Possible acute gastritis/ PUD given the current use of NSAIDs for tooth pain even on an empty stomach, no nausea or vomiting, pain is on & off, intractable, 2nd ED visit for the same pain in less than 24 hours, Sonogram & CT showed cholelithiasis & nephrolithiasis, no CBD stones, normal Lipase & Amylase levels, normal Troponin, no EKG abnormality suggestive of myocardial ischemia, admitted to medicine, pain control, started on Protonix, Maalox PRN, GI consult with Dr. Nieves was called, NPO after midnight for possible EGD today

## 2017-06-18 NOTE — DISCHARGE NOTE ADULT - PROVIDER TOKENS
FREE:[LAST:[pcp],PHONE:[(   )    -],FAX:[(   )    -]],TOKEN:'[id number]' TOKEN:'[id number]',FREE:[LAST:[gallito],FIRST:[tai],PHONE:[(   )    -],FAX:[(   )    -]]

## 2017-06-18 NOTE — DISCHARGE NOTE ADULT - CARE PLAN
Principal Discharge DX:	Epigastric pain  Goal:	may due to constipation or IBS, or PUD, cont protonix daily and take laxative. if pain returns, f/u with Dr Ching for EGD.  Instructions for follow-up, activity and diet:	as above  Secondary Diagnosis:	Dental abscess  Goal:	cont clindamycin and take probiotic twice a day.

## 2017-06-19 VITALS
TEMPERATURE: 98 F | DIASTOLIC BLOOD PRESSURE: 72 MMHG | SYSTOLIC BLOOD PRESSURE: 136 MMHG | RESPIRATION RATE: 16 BRPM | HEART RATE: 96 BPM | OXYGEN SATURATION: 94 %

## 2017-06-19 LAB — TROPONIN I SERPL-MCNC: 0.01 NG/ML — LOW (ref 0.02–0.06)

## 2017-06-19 PROCEDURE — 99239 HOSP IP/OBS DSCHRG MGMT >30: CPT

## 2017-06-19 PROCEDURE — 81001 URINALYSIS AUTO W/SCOPE: CPT

## 2017-06-19 PROCEDURE — 93005 ELECTROCARDIOGRAM TRACING: CPT

## 2017-06-19 PROCEDURE — 76700 US EXAM ABDOM COMPLETE: CPT

## 2017-06-19 PROCEDURE — 74020: CPT

## 2017-06-19 PROCEDURE — 71045 X-RAY EXAM CHEST 1 VIEW: CPT

## 2017-06-19 PROCEDURE — 96374 THER/PROPH/DIAG INJ IV PUSH: CPT

## 2017-06-19 PROCEDURE — 83690 ASSAY OF LIPASE: CPT

## 2017-06-19 PROCEDURE — 71046 X-RAY EXAM CHEST 2 VIEWS: CPT

## 2017-06-19 PROCEDURE — 85730 THROMBOPLASTIN TIME PARTIAL: CPT

## 2017-06-19 PROCEDURE — 80048 BASIC METABOLIC PNL TOTAL CA: CPT

## 2017-06-19 PROCEDURE — 94640 AIRWAY INHALATION TREATMENT: CPT

## 2017-06-19 PROCEDURE — 85027 COMPLETE CBC AUTOMATED: CPT

## 2017-06-19 PROCEDURE — 99283 EMERGENCY DEPT VISIT LOW MDM: CPT

## 2017-06-19 PROCEDURE — 99284 EMERGENCY DEPT VISIT MOD MDM: CPT | Mod: 25

## 2017-06-19 PROCEDURE — 82150 ASSAY OF AMYLASE: CPT

## 2017-06-19 PROCEDURE — 96375 TX/PRO/DX INJ NEW DRUG ADDON: CPT

## 2017-06-19 PROCEDURE — 80053 COMPREHEN METABOLIC PANEL: CPT

## 2017-06-19 PROCEDURE — 84484 ASSAY OF TROPONIN QUANT: CPT

## 2017-06-19 PROCEDURE — 82553 CREATINE MB FRACTION: CPT

## 2017-06-19 PROCEDURE — 74176 CT ABD & PELVIS W/O CONTRAST: CPT

## 2017-06-19 PROCEDURE — 85610 PROTHROMBIN TIME: CPT

## 2017-06-19 RX ORDER — DOCUSATE SODIUM 100 MG
100 CAPSULE ORAL
Qty: 0 | Refills: 0 | Status: DISCONTINUED | OUTPATIENT
Start: 2017-06-19 | End: 2017-06-19

## 2017-06-19 RX ORDER — SENNA PLUS 8.6 MG/1
1 TABLET ORAL
Qty: 10 | Refills: 0 | OUTPATIENT
Start: 2017-06-19 | End: 2017-06-29

## 2017-06-19 RX ORDER — SENNA PLUS 8.6 MG/1
1 TABLET ORAL AT BEDTIME
Qty: 0 | Refills: 0 | Status: DISCONTINUED | OUTPATIENT
Start: 2017-06-19 | End: 2017-06-19

## 2017-06-19 RX ORDER — DOCUSATE SODIUM 100 MG
1 CAPSULE ORAL
Qty: 20 | Refills: 0 | OUTPATIENT
Start: 2017-06-19 | End: 2017-06-29

## 2017-06-19 RX ORDER — PANTOPRAZOLE SODIUM 20 MG/1
1 TABLET, DELAYED RELEASE ORAL
Qty: 30 | Refills: 0 | OUTPATIENT
Start: 2017-06-19 | End: 2017-07-19

## 2017-06-19 RX ADMIN — BUDESONIDE AND FORMOTEROL FUMARATE DIHYDRATE 2 PUFF(S): 160; 4.5 AEROSOL RESPIRATORY (INHALATION) at 06:25

## 2017-06-19 RX ADMIN — HEPARIN SODIUM 5000 UNIT(S): 5000 INJECTION INTRAVENOUS; SUBCUTANEOUS at 06:24

## 2017-06-19 RX ADMIN — Medication 300 MILLIGRAM(S): at 11:36

## 2017-06-19 RX ADMIN — Medication 300 MILLIGRAM(S): at 06:24

## 2017-06-19 RX ADMIN — PANTOPRAZOLE SODIUM 40 MILLIGRAM(S): 20 TABLET, DELAYED RELEASE ORAL at 11:35

## 2017-06-19 NOTE — CONSULT NOTE ADULT - SUBJECTIVE AND OBJECTIVE BOX
87 year old female admitted with epigastric pain wbc normal, normal lft's. lipase ct pos for nephrolithiasis and sono positive gall stones. DDX constipation/IBSm atypical GERD, PUD other  clinically asymptomatic   spoke with daughter conservative mgt unless pain returns  PPI daily  high fiber diet  colace and senna

## 2017-08-29 ENCOUNTER — INPATIENT (INPATIENT)
Facility: HOSPITAL | Age: 82
LOS: 1 days | Discharge: HOME | DRG: 194 | End: 2017-08-31
Attending: INTERNAL MEDICINE | Admitting: INTERNAL MEDICINE
Payer: MEDICARE

## 2017-08-29 VITALS
WEIGHT: 80.03 LBS | HEIGHT: 62 IN | HEART RATE: 104 BPM | DIASTOLIC BLOOD PRESSURE: 71 MMHG | RESPIRATION RATE: 26 BRPM | OXYGEN SATURATION: 92 % | SYSTOLIC BLOOD PRESSURE: 131 MMHG | TEMPERATURE: 98 F

## 2017-08-29 DIAGNOSIS — J44.9 CHRONIC OBSTRUCTIVE PULMONARY DISEASE, UNSPECIFIED: ICD-10-CM

## 2017-08-29 DIAGNOSIS — Z29.9 ENCOUNTER FOR PROPHYLACTIC MEASURES, UNSPECIFIED: ICD-10-CM

## 2017-08-29 DIAGNOSIS — J18.9 PNEUMONIA, UNSPECIFIED ORGANISM: ICD-10-CM

## 2017-08-29 DIAGNOSIS — J47.9 BRONCHIECTASIS, UNCOMPLICATED: ICD-10-CM

## 2017-08-29 LAB
ALBUMIN SERPL ELPH-MCNC: 3.2 G/DL — LOW (ref 3.3–5)
ALP SERPL-CCNC: 98 U/L — SIGNIFICANT CHANGE UP (ref 30–120)
ALT FLD-CCNC: 13 U/L DA — SIGNIFICANT CHANGE UP (ref 10–60)
ANION GAP SERPL CALC-SCNC: 3 MMOL/L — LOW (ref 5–17)
APPEARANCE UR: CLEAR — SIGNIFICANT CHANGE UP
AST SERPL-CCNC: 17 U/L — SIGNIFICANT CHANGE UP (ref 10–40)
BASOPHILS # BLD AUTO: 0.1 K/UL — SIGNIFICANT CHANGE UP (ref 0–0.2)
BASOPHILS NFR BLD AUTO: 1.1 % — SIGNIFICANT CHANGE UP (ref 0–2)
BILIRUB SERPL-MCNC: 0.5 MG/DL — SIGNIFICANT CHANGE UP (ref 0.2–1.2)
BILIRUB UR-MCNC: NEGATIVE — SIGNIFICANT CHANGE UP
BUN SERPL-MCNC: 21 MG/DL — SIGNIFICANT CHANGE UP (ref 7–23)
CALCIUM SERPL-MCNC: 9.9 MG/DL — SIGNIFICANT CHANGE UP (ref 8.4–10.5)
CHLORIDE SERPL-SCNC: 105 MMOL/L — SIGNIFICANT CHANGE UP (ref 96–108)
CO2 SERPL-SCNC: 35 MMOL/L — HIGH (ref 22–31)
COLOR SPEC: YELLOW — SIGNIFICANT CHANGE UP
CREAT SERPL-MCNC: 0.9 MG/DL — SIGNIFICANT CHANGE UP (ref 0.5–1.3)
DIFF PNL FLD: ABNORMAL
EOSINOPHIL # BLD AUTO: 0.1 K/UL — SIGNIFICANT CHANGE UP (ref 0–0.5)
EOSINOPHIL NFR BLD AUTO: 1.1 % — SIGNIFICANT CHANGE UP (ref 0–6)
GLUCOSE SERPL-MCNC: 135 MG/DL — HIGH (ref 70–99)
GLUCOSE UR QL: NEGATIVE MG/DL — SIGNIFICANT CHANGE UP
HCT VFR BLD CALC: 45.5 % — HIGH (ref 34.5–45)
HGB BLD-MCNC: 14.1 G/DL — SIGNIFICANT CHANGE UP (ref 11.5–15.5)
KETONES UR-MCNC: NEGATIVE — SIGNIFICANT CHANGE UP
LACTATE SERPL-SCNC: 1.6 MMOL/L — SIGNIFICANT CHANGE UP (ref 0.7–2)
LEUKOCYTE ESTERASE UR-ACNC: ABNORMAL
LYMPHOCYTES # BLD AUTO: 1 K/UL — SIGNIFICANT CHANGE UP (ref 1–3.3)
LYMPHOCYTES # BLD AUTO: 10.6 % — LOW (ref 13–44)
MCHC RBC-ENTMCNC: 29.5 PG — SIGNIFICANT CHANGE UP (ref 27–34)
MCHC RBC-ENTMCNC: 31 GM/DL — LOW (ref 32–36)
MCV RBC AUTO: 95.3 FL — SIGNIFICANT CHANGE UP (ref 80–100)
MONOCYTES # BLD AUTO: 0.8 K/UL — SIGNIFICANT CHANGE UP (ref 0–0.9)
MONOCYTES NFR BLD AUTO: 8.3 % — SIGNIFICANT CHANGE UP (ref 2–14)
NEUTROPHILS # BLD AUTO: 7.8 K/UL — HIGH (ref 1.8–7.4)
NEUTROPHILS NFR BLD AUTO: 78.9 % — HIGH (ref 43–77)
NITRITE UR-MCNC: NEGATIVE — SIGNIFICANT CHANGE UP
PH UR: 6 — SIGNIFICANT CHANGE UP (ref 5–8)
PLATELET # BLD AUTO: 327 K/UL — SIGNIFICANT CHANGE UP (ref 150–400)
POTASSIUM SERPL-MCNC: 4.4 MMOL/L — SIGNIFICANT CHANGE UP (ref 3.5–5.3)
POTASSIUM SERPL-SCNC: 4.4 MMOL/L — SIGNIFICANT CHANGE UP (ref 3.5–5.3)
PROT SERPL-MCNC: 8.3 G/DL — SIGNIFICANT CHANGE UP (ref 6–8.3)
PROT UR-MCNC: 100 MG/DL
RBC # BLD: 4.78 M/UL — SIGNIFICANT CHANGE UP (ref 3.8–5.2)
RBC # FLD: 12.7 % — SIGNIFICANT CHANGE UP (ref 10.3–14.5)
SODIUM SERPL-SCNC: 143 MMOL/L — SIGNIFICANT CHANGE UP (ref 135–145)
SP GR SPEC: 1.02 — SIGNIFICANT CHANGE UP (ref 1.01–1.02)
UROBILINOGEN FLD QL: NEGATIVE MG/DL — SIGNIFICANT CHANGE UP
WBC # BLD: 9.9 K/UL — SIGNIFICANT CHANGE UP (ref 3.8–10.5)
WBC # FLD AUTO: 9.9 K/UL — SIGNIFICANT CHANGE UP (ref 3.8–10.5)

## 2017-08-29 PROCEDURE — 93970 EXTREMITY STUDY: CPT | Mod: 26

## 2017-08-29 PROCEDURE — 99285 EMERGENCY DEPT VISIT HI MDM: CPT

## 2017-08-29 PROCEDURE — 93010 ELECTROCARDIOGRAM REPORT: CPT

## 2017-08-29 PROCEDURE — 71010: CPT | Mod: 26

## 2017-08-29 PROCEDURE — 99232 SBSQ HOSP IP/OBS MODERATE 35: CPT

## 2017-08-29 RX ORDER — BUDESONIDE, MICRONIZED 100 %
0.5 POWDER (GRAM) MISCELLANEOUS
Qty: 0 | Refills: 0 | Status: DISCONTINUED | OUTPATIENT
Start: 2017-08-29 | End: 2017-08-31

## 2017-08-29 RX ORDER — FAMOTIDINE 10 MG/ML
20 INJECTION INTRAVENOUS DAILY
Qty: 0 | Refills: 0 | Status: DISCONTINUED | OUTPATIENT
Start: 2017-08-29 | End: 2017-08-31

## 2017-08-29 RX ORDER — AZTREONAM 2 G
1000 VIAL (EA) INJECTION EVERY 8 HOURS
Qty: 0 | Refills: 0 | Status: DISCONTINUED | OUTPATIENT
Start: 2017-08-29 | End: 2017-08-30

## 2017-08-29 RX ORDER — ACETAMINOPHEN 500 MG
650 TABLET ORAL EVERY 6 HOURS
Qty: 0 | Refills: 0 | Status: DISCONTINUED | OUTPATIENT
Start: 2017-08-29 | End: 2017-08-31

## 2017-08-29 RX ORDER — AZITHROMYCIN 500 MG/1
500 TABLET, FILM COATED ORAL ONCE
Qty: 0 | Refills: 0 | Status: COMPLETED | OUTPATIENT
Start: 2017-08-29 | End: 2017-08-29

## 2017-08-29 RX ORDER — IPRATROPIUM/ALBUTEROL SULFATE 18-103MCG
3 AEROSOL WITH ADAPTER (GRAM) INHALATION ONCE
Qty: 0 | Refills: 0 | Status: COMPLETED | OUTPATIENT
Start: 2017-08-29 | End: 2017-08-29

## 2017-08-29 RX ORDER — AZITHROMYCIN 500 MG/1
500 TABLET, FILM COATED ORAL EVERY 24 HOURS
Qty: 0 | Refills: 0 | Status: DISCONTINUED | OUTPATIENT
Start: 2017-08-30 | End: 2017-08-30

## 2017-08-29 RX ORDER — AZTREONAM 2 G
1000 VIAL (EA) INJECTION ONCE
Qty: 0 | Refills: 0 | Status: COMPLETED | OUTPATIENT
Start: 2017-08-29 | End: 2017-08-29

## 2017-08-29 RX ORDER — IPRATROPIUM/ALBUTEROL SULFATE 18-103MCG
3 AEROSOL WITH ADAPTER (GRAM) INHALATION EVERY 6 HOURS
Qty: 0 | Refills: 0 | Status: DISCONTINUED | OUTPATIENT
Start: 2017-08-29 | End: 2017-08-31

## 2017-08-29 RX ORDER — ENOXAPARIN SODIUM 100 MG/ML
30 INJECTION SUBCUTANEOUS DAILY
Qty: 0 | Refills: 0 | Status: DISCONTINUED | OUTPATIENT
Start: 2017-08-30 | End: 2017-08-31

## 2017-08-29 RX ORDER — DIPHENHYDRAMINE HCL 50 MG
25 CAPSULE ORAL ONCE
Qty: 0 | Refills: 0 | Status: COMPLETED | OUTPATIENT
Start: 2017-08-29 | End: 2017-08-29

## 2017-08-29 RX ORDER — SODIUM CHLORIDE 9 MG/ML
1000 INJECTION INTRAMUSCULAR; INTRAVENOUS; SUBCUTANEOUS
Qty: 0 | Refills: 0 | Status: DISCONTINUED | OUTPATIENT
Start: 2017-08-29 | End: 2017-08-31

## 2017-08-29 RX ORDER — SODIUM CHLORIDE 9 MG/ML
1000 INJECTION INTRAMUSCULAR; INTRAVENOUS; SUBCUTANEOUS ONCE
Qty: 0 | Refills: 0 | Status: COMPLETED | OUTPATIENT
Start: 2017-08-29 | End: 2017-08-29

## 2017-08-29 RX ADMIN — Medication 50 MILLIGRAM(S): at 21:13

## 2017-08-29 RX ADMIN — Medication 3 MILLILITER(S): at 20:01

## 2017-08-29 RX ADMIN — SODIUM CHLORIDE 1000 MILLILITER(S): 9 INJECTION INTRAMUSCULAR; INTRAVENOUS; SUBCUTANEOUS at 14:15

## 2017-08-29 RX ADMIN — Medication 50 MILLIGRAM(S): at 16:20

## 2017-08-29 RX ADMIN — AZITHROMYCIN 255 MILLIGRAM(S): 500 TABLET, FILM COATED ORAL at 15:10

## 2017-08-29 RX ADMIN — SODIUM CHLORIDE 50 MILLILITER(S): 9 INJECTION INTRAMUSCULAR; INTRAVENOUS; SUBCUTANEOUS at 18:01

## 2017-08-29 RX ADMIN — Medication 25 MILLIGRAM(S): at 23:21

## 2017-08-29 RX ADMIN — Medication 200 MILLIGRAM(S): at 21:13

## 2017-08-29 RX ADMIN — Medication 3 MILLILITER(S): at 15:03

## 2017-08-29 RX ADMIN — Medication 40 MILLIGRAM(S): at 22:26

## 2017-08-29 NOTE — ED PROVIDER NOTE - PROGRESS NOTE DETAILS
Attending Contribution to Care:  88 y/o F pt currently being treated outpatient for PNA seen at Urgent Care today for worsening symptoms sent to ED today. Plan: Sepsis workup and likely admit to Dr. Castelan. call out to Dr Castelan, awaiting call back spoke with Dr Castelan , case discussed, will admit patient

## 2017-08-29 NOTE — H&P ADULT - HISTORY OF PRESENT ILLNESS
86 yo female WITH HX OF COPD, BRONCHIECTASIS  ,PNEUMONIA  BIBA  presents with worsening productive cough, congestion,sob and montero . as per son  at bedside patient began have having cough last about 2 weeks ag denies, fever,  was seen in urgent care diagnosed pharyngitis, prescribed zpak, completed that medication, was seen by pmd Dr Dirk Burgos on Tuesday, was started on Levaquin, also is on OTC  delsym for cough with no improvement .Patient is   nonsmoker.  patient has nebulizer at home.and was doing respiratory tx  Chest xray demonstarted pneumonia and iv abx started Seen by Dr Hansen ,pulmonologist ( covering ) Admitted for septic workup and evaluation,send blood and urine cx,serial lactate levels,monitor vitals closley,ivfs hydration,monitor urine output and renal profile,iv abx initiated

## 2017-08-29 NOTE — PROVIDER CONTACT NOTE (OTHER) - BACKGROUND
admitted for PNA .pt on IV ABX. IV azactam( 2nd dose of the day) given at 1 hr ago and solumedrol 40 mg IVP x1 given 10 min ago.

## 2017-08-29 NOTE — H&P ADULT - PMH
COPD (chronic obstructive pulmonary disease)    Gallstone Bronchiectasis    COPD (chronic obstructive pulmonary disease)    Gallstone

## 2017-08-29 NOTE — H&P ADULT - PROBLEM SELECTOR PLAN 1
continue current managmnet and medications Admit for antibacterial managmnet ,intravenous steroids,nebulised bronchodilators and pulmonology evaluation,O2 supply,serial labs and chest xrays,obtain ECHO to evaluate LVEF and rule out chf component

## 2017-08-29 NOTE — PROVIDER CONTACT NOTE (OTHER) - ASSESSMENT
developed sm diffuse uticaria with surrounding erythema on upper back and upper arm and leg./61 HR 96.Resp. 18.O2 sat 95% on 3LPM .Denies SOB or C/P

## 2017-08-29 NOTE — H&P ADULT - ASSESSMENT
88 yo female WITH HX OF COPD, BRONCHIECTASIS  ,PNEUMONIA  BIBA  presents with worsening productive cough, congestion,sob and montero . as per son  at bedside patient began have having cough last about 2 weeks ag denies, fever,  was seen in urgent care diagnosed pharyngitis, prescribed zpak, completed that medication, was seen by pmd Dr Dirk Burgos on Tuesday, was started on Levaquin, also is on OTC  delsym for cough with no improvement .Patient is   nonsmoker.  patient has nebulizer at home.and was doing respiratory tx  Chest xray demonstarted pneumonia and iv abx started Seen by Dr Hansen ,pulmonologist ( covering ) Admitted for septic workup and evaluation,send blood and urine cx,serial lactate levels,monitor vitals closley,ivfs hydration,monitor urine output and renal profile,iv abx initiated

## 2017-08-29 NOTE — CONSULT NOTE ADULT - SUBJECTIVE AND OBJECTIVE BOX
Patient seen and examined today Plan discussed/Questions answered  (with patient/ancillary staff/colleagues) Chart notes reviewd More detailed Pulm/Critical Care notes, assessment and plan  will be added later today as needed after reviewing further labs as they become available     Notable interval events reviewed    ROS/PE  . REVIEW OF SYMPTOMS    Able to give ROS  Yes     RELIABLE YES   Weakness Yes   Chills No   Vision changes No  Lymph nodes No enlarged glands   Endocrine No unexplained hair loss No het or cold intolerance   Allergy No hives   Sore throat No   Coughing blood No  Headache No  Confusion YES  Chest pain No  Palpitations No   Pain abdomen NO   Shortness of breath YES  Vomiting NO  Pain neck No  Pain abdomen NO     PHYSICAL EXAM    HEENT Unremarkable PERRLA atraumatic  RESP Fair air entry EXP prolonged    Harsh breath sound Resp distres mild  CARDIAC S1 S2 No S3     NO JVD   Awake Alert and ORIENTED x THREE  ABDOMEN SOFT BS PRESENT NOT DISTENDED No hepatosplenomegaly  PEDAL EDEMA present No calf tenderness  NO rash GENERAL Not TOXIC looking  . Patient                                    RITA BLANCAS [facility] S [id number] 11/4/1929   ALLERGY Rocephin   CONTACT Dtr [facility] [place] [phone] self [phone]  REASON FOR VISIT   Initial evaluation/Pulmonary consultation requested  8/29/2017 by Dr Castelan from Dr Hansen  Patient examined chart reviewed  HOSPITAL ADMISSION [facility] Dr Castelan 8/29/2017  PATIENT CAME  FROM (if information available)    PAST MEDICAL & SURGICAL HISTORY:  Past Medical History:  COPD (chronic obstructive pulmonary disease)    Gallstone.  Family History:  No pertinent family history in first degree relatives.  PATIENT DESCRIPTION [facility] SOCIAL   88 yo female biba, accompanied to ed by daughter presents with worsening cough, congestion, hx of copd, as per daughter at bedside patient began have having cough last sunday, denies, fever,  was seen in urgent care diagnosed pharyngitis, prescribed zpak, completed that medication, was seen by pmd Dr Dirk Burgos on Tuesday, was started on Levaquin, today is day 6, also is on otc delsym for cough.  cough has not improved.  patient has hx of copd, states has had pna in the past.  nonsmoker.  patient has nebulizer at home, uses albuterol,  yesterday used it twice, and once today this morning.  daughter states Dr Burgos is patient PMD and Pulmonary Dr.    ER VITALS 8/29/2017 afeb 104 130/70 26 92%   VITALS/LABS  8/29/2017 afeb 91 102/60 20 98%   NS 50 (8/29)   8/29/2017 W 9.9 Hb 14.1 Plt 327 Na 143 K 4.4 CO2 35 Cr .9 G 135   PROBLEM ASSESSMENT PLAN   PNEUMONIA  8/29/2017 CXR   1) Incr reticular density R perihilum with peribronchial thickening 2) This appearance extends to ru and rl lung fields 3) R hilum prominent Findings sugg pneumonia   Azithro (8/29) aztreonam (8/29)   PROMINENT R HILUM   829 CT ch ordered  COPD   duoneb.4 (8/29) Steroids added   RO VTE   V duplex ordered 8/29  MALNUTRITION   8/29 Alb 3.2   GLOBAL ISSUE/BEST PRACTICE:        PROBLEM:      Analgesia:     na                        PROBLEM: Sedation:     na               PROBLEM: HOB elevation:   y             PROBLEM: Stress ulcer proph:    pepcid 20 (8/29)                       PROBLEM: VTE prophylaxis:      lvnx 30 (8/29)                   PROBLEM: Glycemic control:    na   PROBLEM: Nutrition:    soft (8/29)           PROBLEM: Advanced directive: na     PROBLEM: Allergies:  na  HOSPITAL COURSE PLAN   88 yo female nonsmoker HO passive smoking () has home nebulizer Uses brovana No HO MI Baseline does not drive (out of choice) had been sick x 2 w pta Rxed with zpak then levaquin without relief with yellow phlegm No fever no chest pain no hemoptsyis   admitted [facility] S 8/29/2017 with poss pneumonia COPD ex  ER VITALS 8/29/2017 afeb 104 130/70 26 92%   PROBLEM ASSESSMENT PLAN   PNEUMONIA 8/29/2017 CXR R perihilar pneumonia   Rxed Azithro (8/29) aztreonam (8/29)   PROMINENT R HILUM  8/29 CT ch ordered  COPD  duoneb.4 (8/29) Steroids added   RO VTE  V duplex ordered 8/29  MALNUTRITION  8/29 Alb 3.2   TIME SPENT Over 55 minutes aggregate  care time spent on encounter; activities included   direct patient care, counseling and/or coordinating care reviewing notes, lab data/ imaging , discussion with multidisciplinary team/ patient  /family. Risks, benefits, alternatives  discussed in detail. Questions/concerns  were addressed  to the best of my ability .

## 2017-08-29 NOTE — H&P ADULT - PROBLEM SELECTOR PLAN 3
Admitted for septic workup and evaluation,send blood and urine cx,serial lactate levels,monitor vitals closley,ivfs hydration,monitor urine output and renal profile,iv abx initiated ( azactam) id cons called

## 2017-08-29 NOTE — H&P ADULT - RS GEN PE MLT RESP DETAILS PC
respirations non-labored/good air movement/diminished breath sounds, L/diminished breath sounds, R/airway patent/normal/breath sounds equal

## 2017-08-29 NOTE — CHART NOTE - NSCHARTNOTEFT_GEN_A_CORE
Called to see patient for pruritis.  Patient admitted for PNA and has a history of COPD.  Patient was given azactam at 9:30pm and then solumedrol around 10:20pm when she then started to have diffuse pruritis, located on her back and her arms and legs.  Went to see patient and patient with small uticaric wheals in her upper back and arms and left leg with surrounding erythema.  Patient denies any breathing difficulties.  VS: /61 HR 96 O2 95% T 98.1F orally.    Lung sounds were with crackles but no wheezing.  No upper airway wheezing or stridor.  Patient speaking in full sentences.  Patient does report a hx of an allergic reaction to rocephin at [place] with diffuse uticaria.  Therefore likely to be related to azactam and not to solumedrol.  Explained to patient but patient is refusing all medications including any new antibiotics (willing to take benadryl).  Will order IV benadryl 25mg IV x1 and defer changing abx to primary team (hold azactam and solumedrol).

## 2017-08-29 NOTE — ED PROVIDER NOTE - OBJECTIVE STATEMENT
r/o pna 88 yo female tamara, accompanied to ed by daughter presents with worsening cough, congestion, hx of copd, as per daughter at bedside patient began have having cough last sunday, denies, fever,  was seen in urgent care diagnosed pharyngitis, prescribed zpak, completed that medication, was seen by pmd Dr Dirk Burgos on Tuesday, was started on Levaquin, today is day 6, also is on otc delsym for cough.  cough has not improved.  patient has hx of copd, states has had pna in the past.  nonsmoker.  patient has nebulizer at home, uses albuterol,  yesterday used it twice, and once today this morning.  daughter states Dr Burgos is patient PMD and Pulmonary

## 2017-08-30 DIAGNOSIS — B35.1 TINEA UNGUIUM: ICD-10-CM

## 2017-08-30 LAB
ANION GAP SERPL CALC-SCNC: 5 MMOL/L — SIGNIFICANT CHANGE UP (ref 5–17)
BASOPHILS # BLD AUTO: 0 K/UL — SIGNIFICANT CHANGE UP (ref 0–0.2)
BASOPHILS NFR BLD AUTO: 0.2 % — SIGNIFICANT CHANGE UP (ref 0–2)
BUN SERPL-MCNC: 21 MG/DL — SIGNIFICANT CHANGE UP (ref 7–23)
CALCIUM SERPL-MCNC: 9.1 MG/DL — SIGNIFICANT CHANGE UP (ref 8.4–10.5)
CHLORIDE SERPL-SCNC: 109 MMOL/L — HIGH (ref 96–108)
CO2 SERPL-SCNC: 31 MMOL/L — SIGNIFICANT CHANGE UP (ref 22–31)
CREAT SERPL-MCNC: 0.82 MG/DL — SIGNIFICANT CHANGE UP (ref 0.5–1.3)
CULTURE RESULTS: SIGNIFICANT CHANGE UP
EOSINOPHIL # BLD AUTO: 0 K/UL — SIGNIFICANT CHANGE UP (ref 0–0.5)
EOSINOPHIL NFR BLD AUTO: 0 % — SIGNIFICANT CHANGE UP (ref 0–6)
GLUCOSE SERPL-MCNC: 149 MG/DL — HIGH (ref 70–99)
HCT VFR BLD CALC: 38.5 % — SIGNIFICANT CHANGE UP (ref 34.5–45)
HGB BLD-MCNC: 12.5 G/DL — SIGNIFICANT CHANGE UP (ref 11.5–15.5)
INR BLD: 1.13 RATIO — SIGNIFICANT CHANGE UP (ref 0.88–1.16)
LEGIONELLA AG UR QL: NEGATIVE — SIGNIFICANT CHANGE UP
LYMPHOCYTES # BLD AUTO: 0.5 K/UL — LOW (ref 1–3.3)
LYMPHOCYTES # BLD AUTO: 6.5 % — LOW (ref 13–44)
MCHC RBC-ENTMCNC: 30.8 PG — SIGNIFICANT CHANGE UP (ref 27–34)
MCHC RBC-ENTMCNC: 32.4 GM/DL — SIGNIFICANT CHANGE UP (ref 32–36)
MCV RBC AUTO: 94.9 FL — SIGNIFICANT CHANGE UP (ref 80–100)
MONOCYTES # BLD AUTO: 0.1 K/UL — SIGNIFICANT CHANGE UP (ref 0–0.9)
MONOCYTES NFR BLD AUTO: 1.4 % — LOW (ref 2–14)
NEUTROPHILS # BLD AUTO: 6.6 K/UL — SIGNIFICANT CHANGE UP (ref 1.8–7.4)
NEUTROPHILS NFR BLD AUTO: 91.8 % — HIGH (ref 43–77)
PLATELET # BLD AUTO: 283 K/UL — SIGNIFICANT CHANGE UP (ref 150–400)
POTASSIUM SERPL-MCNC: 4.7 MMOL/L — SIGNIFICANT CHANGE UP (ref 3.5–5.3)
POTASSIUM SERPL-SCNC: 4.7 MMOL/L — SIGNIFICANT CHANGE UP (ref 3.5–5.3)
PROCALCITONIN SERPL-MCNC: 0.08 NG/ML — HIGH (ref 0–0.04)
PROTHROM AB SERPL-ACNC: 12.3 SEC — SIGNIFICANT CHANGE UP (ref 9.8–12.7)
RBC # BLD: 4.06 M/UL — SIGNIFICANT CHANGE UP (ref 3.8–5.2)
RBC # FLD: 12.7 % — SIGNIFICANT CHANGE UP (ref 10.3–14.5)
SODIUM SERPL-SCNC: 145 MMOL/L — SIGNIFICANT CHANGE UP (ref 135–145)
SPECIMEN SOURCE: SIGNIFICANT CHANGE UP
WBC # BLD: 7.2 K/UL — SIGNIFICANT CHANGE UP (ref 3.8–10.5)
WBC # FLD AUTO: 7.2 K/UL — SIGNIFICANT CHANGE UP (ref 3.8–10.5)

## 2017-08-30 PROCEDURE — 93306 TTE W/DOPPLER COMPLETE: CPT | Mod: 26

## 2017-08-30 PROCEDURE — 71250 CT THORAX DX C-: CPT | Mod: 26

## 2017-08-30 RX ADMIN — Medication 3 MILLILITER(S): at 07:14

## 2017-08-30 RX ADMIN — ENOXAPARIN SODIUM 30 MILLIGRAM(S): 100 INJECTION SUBCUTANEOUS at 11:39

## 2017-08-30 RX ADMIN — AZITHROMYCIN 255 MILLIGRAM(S): 500 TABLET, FILM COATED ORAL at 14:06

## 2017-08-30 RX ADMIN — Medication 3 MILLILITER(S): at 12:39

## 2017-08-30 RX ADMIN — Medication 40 MILLIGRAM(S): at 21:46

## 2017-08-30 RX ADMIN — FAMOTIDINE 20 MILLIGRAM(S): 10 INJECTION INTRAVENOUS at 11:39

## 2017-08-30 RX ADMIN — Medication 0.5 MILLIGRAM(S): at 19:23

## 2017-08-30 RX ADMIN — Medication 200 MILLIGRAM(S): at 21:45

## 2017-08-30 RX ADMIN — Medication 0.5 MILLIGRAM(S): at 07:14

## 2017-08-30 RX ADMIN — SODIUM CHLORIDE 50 MILLILITER(S): 9 INJECTION INTRAMUSCULAR; INTRAVENOUS; SUBCUTANEOUS at 11:38

## 2017-08-30 RX ADMIN — Medication 3 MILLILITER(S): at 19:23

## 2017-08-30 RX ADMIN — Medication 200 MILLIGRAM(S): at 14:06

## 2017-08-30 NOTE — PHYSICAL THERAPY INITIAL EVALUATION ADULT - PERTINENT HX OF CURRENT PROBLEM, REHAB EVAL
Pt. admitted with worsening productive cough, congestion, sob and montero.  Pt. found to have pneumonia on chest CT.   Bilateral venous dopplers->neg for DVT.

## 2017-08-30 NOTE — DIETITIAN INITIAL EVALUATION ADULT. - OTHER INFO
Pt c hx COPD, bronchiectasis admitted c pna. Pt c fair appetite and preferes softer texture foods. Pt appears very underweight. She reports some gradual weight loss over the past 6 months but cannot quantify amount. FAmily expresses concern that pt eats very little. Suggested supplement ensure. Family states they gave her this PTA and she did niot like the taste. Suggested ensure clear which pt is agreeable to. Also made pt aware of menu options available to her. Preferences obtained.

## 2017-08-30 NOTE — CONSULT NOTE ADULT - SUBJECTIVE AND OBJECTIVE BOX
S :   87y year old Female seen at bedside with a chief complaint of   painful thickened, dystrophic, ingrowing  and long toenails digits 1-5 bilaterally  and preventative foot examination. Patient is medically managed  by  Dr Castelan.  Patient denies any history o f trauma to both feet.  Patient has no other pedal complaints.  Patient is experiencing pain while standing, walking and in shoe gear.       Patient admits to  (-) Fevers, (-) Chills, (-) Nausea, (-) Vomiting, (-) Shortness of Breath (-) calf pain (-) chest pain       PMH: Bronchiectasis  Gallstone  COPD (chronic obstructive pulmonary disease)    PSH: non significan    Allergies:Rocephin (Hives)      Labs:                        12.5   7.2   )-----------( 283      ( 30 Aug 2017 06:36 )             38.5       WBC Trend  7.2 Date (08-30 @ 06:36)  9.9 Date (08-29 @ 14:42)      Chem  08-30    145  |  109<H>  |  21  ----------------------------<  149<H>  4.7   |  31  |  0.82    Ca    9.1      30 Aug 2017 06:36    TPro  8.3  /  Alb  3.2<L>  /  TBili  0.5  /  DBili  x   /  AST  17  /  ALT  13  /  AlkPhos  98  08-29          T(F): 98.1 (08-30-17 @ 16:54), Max: 98.6 (08-29-17 @ 20:44)  HR: 90 (08-30-17 @ 16:54) (80 - 97)  BP: 111/58 (08-30-17 @ 16:54) (96/57 - 123/72)  RR: 17 (08-30-17 @ 16:54) (17 - 20)  SpO2: 98% (08-30-17 @ 16:54) (95% - 98%)  Wt(kg): --    O:   Integument:  Skin warm, dry and supple bilateral.  No open lesions or inter-digital macerations noted bilateral.   Toenails 1-5 Right and Left feet thickened, elongated, discolored, and dystrophic with subungual debris. There is pain upon palpation of all fungal and ingrowing nails 1-5 bilaterally.   Vascular: Dorsalis Pedis and Posterior Tibial pulses 1/4.  Capillary re-fill time less than 3 seconds digits 1-5 bilateral. Neuro: Protective sensation intact to the level of the digits bilateral.  MSK: Muscle strength 5/5 all major muscle groups bilateral. No structural abnormality, bilaterally      A:   1. bilateral hypertrohic Onychomycosis digits 1-5   2. Pain from Elongated nails, ingrowing  and dystrophic nails  P: Discussed diagnosis and treatment with patient  Aseptic debridement and curettage  of all fungal and ingrowing  nails 1-5 bilateral with sterile nail pack  Discussed importance of daily foot examinations and proper shoe gear  Please re-consult as needed.

## 2017-08-30 NOTE — GOALS OF CARE CONVERSATION - PERSONAL ADVANCE DIRECTIVE - CONVERSATION DETAILS
spoke to pt with her son at bedside. Discussed and explained resuscitation. Pt states that she understands as her  had experienced this and she states that she does want CPR initiated.

## 2017-08-30 NOTE — PROGRESS NOTE ADULT - SUBJECTIVE AND OBJECTIVE BOX
PROGRESS NOTE    OVERNIGHT  No new issues reported by medical staff . All above noted SUBJECTIVE - OOBTC NAD VSS Off oxygen Family is at bedside   Patient is doing better,PT ordered Family requests podiatry consult   PAST MEDICAL & SURGICAL HISTORY:  Bronchiectasis  Gallstone  COPD (chronic obstructive pulmonary disease)    HEALTH ISSUES - PROBLEM Dx:  Prophylactic measure: Prophylactic measure  Pneumonia: Pneumonia  COPD (chronic obstructive pulmonary disease): COPD (chronic obstructive pulmonary disease)  Bronchiectasis: Bronchiectasis          MEDICATIONS  (STANDING):  ALBUTerol/ipratropium for Nebulization 3 milliLiter(s) Nebulizer every 6 hours  aztreonam  IVPB 1000 milliGRAM(s) IV Intermittent every 8 hours  azithromycin  IVPB 500 milliGRAM(s) IV Intermittent every 24 hours  guaiFENesin    Syrup 200 milliGRAM(s) Oral every 8 hours  sodium chloride 0.9%. 1000 milliLiter(s) (50 mL/Hr) IV Continuous <Continuous>  enoxaparin Injectable 30 milliGRAM(s) SubCutaneous daily  famotidine    Tablet 20 milliGRAM(s) Oral daily  buDESOnide   0.5 milliGRAM(s) Respule 0.5 milliGRAM(s) Inhalation two times a day  methylPREDNISolone sodium succinate Injectable 40 milliGRAM(s) IV Push daily    MEDICATIONS  (PRN):  acetaminophen   Tablet 650 milliGRAM(s) Oral every 6 hours PRN For Temp greater than 38 C (100.4 F)  acetaminophen   Tablet. 650 milliGRAM(s) Oral every 6 hours PRN Mild Pain (1 - 3)      OBJECTIVE    T(C): 36.8 (17 @ 13:10), Max: 37.6 (17 @ 18:22)  HR: 89 (17 @ 13:10) (80 - 104)  BP: 110/62 (17 @ 13:10) (94/48 - 123/72)  RR: 18 (17 @ 13:10) (18 - 24)  SpO2: 98% (17 @ 13:10) (95% - 98%)  Wt(kg): --  I&O's Summary    29 Aug 2017 07:01  -  30 Aug 2017 07:00  --------------------------------------------------------  IN: 600 mL / OUT: 0 mL / NET: 600 mL    30 Aug 2017 07:01  -  30 Aug 2017 15:21  --------------------------------------------------------  IN: 700 mL / OUT: 0 mL / NET: 700         PHYSICAL EXAM:  Appearance: NAD.   HEENT:   Moist oral mucosa. Conjunctiva clear b/l.   Neck : supple  Cardiovascular: RRR ,S1S2 present  Respiratory: Lungs CTAB.No wheesing   Gastrointestinal:  Soft, nontender. No rebound. No rigidity. BS present	  Skin: No rashes, No ecchymoses, No cyanosis.	  Neurologic: Non-focal. Moving all extremities. Following commands.  Extremities: No edema. No erythema. No calf tenderness.Onychomycosis   	  LABS:                        12.5   7.2   )-----------( 283      ( 30 Aug 2017 06:36 )             38.5     08-30    145  |  109<H>  |  21  ----------------------------<  149<H>  4.7   |  31  |  0.82    Ca    9.1      30 Aug 2017 06:36    TPro  8.3  /  Alb  3.2<L>  /  TBili  0.5  /  DBili  x   /  AST  17  /  ALT  13  /  AlkPhos  98  08-29    PT/INR - ( 30 Aug 2017 06:36 )   PT: 12.3 sec;   INR: 1.13 ratio           Urinalysis Basic - ( 29 Aug 2017 18:11 )    Color: Yellow / Appearance: Clear / S.020 / pH: x  Gluc: x / Ketone: Negative  / Bili: Negative / Urobili: Negative mg/dL   Blood: x / Protein: 100 mg/dL / Nitrite: Negative   Leuk Esterase: Trace / RBC: 25-50 /HPF / WBC 6-10   Sq Epi: x / Non Sq Epi: Few / Bacteria: Many        RADIOLOGY & ADDITIONAL TESTS:    ASSESSMENT/PLAN:

## 2017-08-30 NOTE — CONSULT NOTE ADULT - SUBJECTIVE AND OBJECTIVE BOX
History of Present Illness: The patient is an 87 year old female with a history of COPD, bronchiectasis who presents with cough and shortness of breath. Her symptoms began one week ago. Her cough is intermittently productive of yellow sputum. She has felt very tired over the past few months, worse over the past week. Denies chest pain, palpitations, dizziness, lower extremity edema, orthopnea. No fevers at home. She was tried on levofloxacin as an outpatient but symptoms did not improve.    Past Medical/Surgical History:  COPD, bronchiectasis    Medications:  MEDICATIONS  (STANDING):  ALBUTerol/ipratropium for Nebulization 3 milliLiter(s) Nebulizer every 6 hours  aztreonam  IVPB 1000 milliGRAM(s) IV Intermittent every 8 hours  azithromycin  IVPB 500 milliGRAM(s) IV Intermittent every 24 hours  guaiFENesin    Syrup 200 milliGRAM(s) Oral every 8 hours  sodium chloride 0.9%. 1000 milliLiter(s) (50 mL/Hr) IV Continuous <Continuous>  enoxaparin Injectable 30 milliGRAM(s) SubCutaneous daily  famotidine    Tablet 20 milliGRAM(s) Oral daily  buDESOnide   0.5 milliGRAM(s) Respule 0.5 milliGRAM(s) Inhalation two times a day  methylPREDNISolone sodium succinate Injectable 40 milliGRAM(s) IV Push daily    MEDICATIONS  (PRN):  acetaminophen   Tablet 650 milliGRAM(s) Oral every 6 hours PRN For Temp greater than 38 C (100.4 F)  acetaminophen   Tablet. 650 milliGRAM(s) Oral every 6 hours PRN Mild Pain (1 - 3)      Family History: Non-contributory family history of premature cardiovascular atherosclerotic disease    Social History: No tobacco, alcohol or drug use    Review of Systems:  General: No fevers, chills, weight loss or gain  Skin: No rashes, color changes  Cardiovascular: No chest pain, orthopnea  Respiratory: No shortness of breath, cough  Gastrointestinal: No nausea, abdominal pain  Genitourinary: No incontinence, pain with urination  Musculoskeletal: No pain, swelling, decreased range of motion  Neurological: No headache, weakness  Psychiatric: No depression, anxiety  Endocrine: No weight loss or gain, increased thirst  All other systems are comprehensively negative.    Physical Exam:  Vitals:        Vital Signs Last 24 Hrs  T(C): 36.7 (30 Aug 2017 09:22), Max: 37.6 (29 Aug 2017 18:22)  T(F): 98.1 (30 Aug 2017 09:22), Max: 99.6 (29 Aug 2017 18:22)  HR: 90 (30 Aug 2017 09:22) (84 - 106)  BP: 96/57 (30 Aug 2017 09:22) (94/48 - 131/71)  BP(mean): --  RR: 18 (30 Aug 2017 09:22) (18 - 26)  SpO2: 96% (30 Aug 2017 09:22) (92% - 98%)  General: NAD  Neck: No JVD, no carotid bruit  Lungs: rales right base  CV: RRR, nl S1/S2, no M/R/G  Abdomen: S/NT/ND, +BS  Extremities: No LE edema, no cyanosis    Labs:                        12.5   7.2   )-----------( 283      ( 30 Aug 2017 06:36 )             38.5     08-30    145  |  109<H>  |  21  ----------------------------<  149<H>  4.7   |  31  |  0.82    Ca    9.1      30 Aug 2017 06:36    TPro  8.3  /  Alb  3.2<L>  /  TBili  0.5  /  DBili  x   /  AST  17  /  ALT  13  /  AlkPhos  98  08-29        PT/INR - ( 30 Aug 2017 06:36 )   PT: 12.3 sec;   INR: 1.13 ratio             ECG: Sinus tachycardia, normal axis, incomplete RBBB    Telemetry: Sinus rhythm, tachycardic at times, sinus arrhythmia at times

## 2017-08-30 NOTE — CONSULT NOTE ADULT - ASSESSMENT
The patient is an 87 year old female with a history of COPD, bronchiectasis who presents with cough and shortness of breath in the setting of pneumonia, bronchiectasis and COPD.    Plan:  - Appears hypovolemic on exam. Continue fluids.  - Check echocardiogram  - CT chest findings noted; evidence of chronic lung disease, no vascular congestion  - LE venous duplex negative for DVT  - Continue antibiotics (?possible allergy to aztreonam). Consider ID evaluation.

## 2017-08-30 NOTE — CONSULT NOTE ADULT - PROBLEM SELECTOR RECOMMENDATION 9
debridement and curettage of all fungal and ingrowing nails under aseptic technique  recommended patient have regular podiatric care  patient to follow up in our office as outpatient

## 2017-08-30 NOTE — PHYSICAL THERAPY INITIAL EVALUATION ADULT - BED MOBILITY TRAINING, PT EVAL
Goals (3-5 sessions): Sup<->sit independent             Stairs: Up/down 6 stairs with railing independently

## 2017-08-30 NOTE — PROGRESS NOTE ADULT - PROBLEM SELECTOR PLAN 1
continue current managmnet and medications Admit for antibacterial managmnet ,intravenous steroids,nebulised bronchodilators and pulmonology evaluation,O2 supply,serial labs and chest xrays,obtain ECHO to evaluate LVEF and rule out chf component ruled out for sepsis ID cons is pending

## 2017-08-30 NOTE — CONSULT NOTE ADULT - SUBJECTIVE AND OBJECTIVE BOX
HPI:  86 yo female WITH HX OF COPD, BRONCHIECTASIS  ,PNEUMONIA  BIBA with worsening   productive cough, congestion and sob. Denies fever chills n/v/diarrhea, Per daughter patient   has been having cough for past week. Seen in urgent care and was diagnosed with pharyngitis,   prescribed zpak but cough continued. Seen by her pulm and given Levaquin which pt took  for 6 out of 7 days. Did not get better and was brought to hospital to be evaluated. In ER she  was afebrile. WBC WNL. CT chest showed chronic lung disease with bronchiectasis. She   started on Azactam and Zithromax but had reaction to Azacatam.    Pt refusing meds at this time.    Infectious disease consult was called to evaluate pt and for antibiotic management.    Past Medical & Surgical Hx:  PAST MEDICAL & SURGICAL HISTORY:  Bronchiectasis  Gallstone  COPD (chronic obstructive pulmonary disease)      Social History--  EtOH: denies   Tobacco: denies   Drug Use: denies     Travel/Environmental/Occupational History:  NONE    FAMILY HISTORY:  No pertinent family history in first degree relatives      Allergies  Rocephin (Hives)      Home Medications:   * Patient Currently Takes Medications as of 29-Aug-2017 13:58 documented in Prescription Writer  · 	albuterol 0.63 mg/3 mL (0.021%) inhalation solution:  inhaled once a day, Last Dose Taken:        Current Inpatient Medications :    ANTIBIOTICS:   aztreonam  IVPB 1000 milliGRAM(s) IV Intermittent every 8 hours  azithromycin  IVPB 500 milliGRAM(s) IV Intermittent every 24 hours      OTHER RELEVANT MEDICATIONS :  ALBUTerol/ipratropium for Nebulization 3 milliLiter(s) Nebulizer every 6 hours  acetaminophen   Tablet 650 milliGRAM(s) Oral every 6 hours PRN  acetaminophen   Tablet. 650 milliGRAM(s) Oral every 6 hours PRN  guaiFENesin    Syrup 200 milliGRAM(s) Oral every 8 hours  sodium chloride 0.9%. 1000 milliLiter(s) IV Continuous <Continuous>  enoxaparin Injectable 30 milliGRAM(s) SubCutaneous daily  famotidine    Tablet 20 milliGRAM(s) Oral daily  buDESOnide   0.5 milliGRAM(s) Respule 0.5 milliGRAM(s) Inhalation two times a day  methylPREDNISolone sodium succinate Injectable 40 milliGRAM(s) IV Push daily      ROS:  CONSTITUTIONAL:  Negative fever or chills   EYES:  Negative  blurry vision or double vision  CARDIOVASCULAR:  Negative for chest pain or palpitations  RESPIRATORY: + productive cough, wheezing, SOB   GASTROINTESTINAL:  Negative for nausea, vomiting, diarrhea, constipation, or abdominal pain  GENITOURINARY:  Negative frequency, urgency , dysuria or hematuria   NEUROLOGIC:  No headache, confusion, dizziness, lightheadedness  All other systems were reviewed and are negative    I&O's Detail    29 Aug 2017 07:  -  30 Aug 2017 07:00  --------------------------------------------------------  IN:    IV PiggyBack: 50 mL    sodium chloride 0.9%.: 550 mL  Total IN: 600 mL    OUT:  Total OUT: 0 mL    Total NET: 600 mL      30 Aug 2017 07:01  -  30 Aug 2017 17:10  --------------------------------------------------------  IN:    IV PiggyBack: 250 mL    sodium chloride 0.9%.: 450 mL  Total IN: 700 mL    OUT:  Total OUT: 0 mL    Total NET: 700 mL    Physical Exam:  Vital Signs Last 24 Hrs  T(C): 36.7 (30 Aug 2017 16:54), Max: 37.6 (29 Aug 2017 18:22)  T(F): 98.1 (30 Aug 2017 16:54), Max: 99.6 (29 Aug 2017 18:22)  HR: 90 (30 Aug 2017 16:54) (80 - 100)  BP: 111/58 (30 Aug 2017 16:54) (96/57 - 123/72)  BP(mean): --  RR: 17 (30 Aug 2017 16:54) (17 - 20)  SpO2: 98% (30 Aug 2017 16:54) (95% - 98%)      General: No acute distress  Eyes: sclera anicteric, pupils equal and reactive to light  ENMT: buccal mucosa moist, pharynx not injected  Neck: supple, trachea midline  Lungs: Decreased + congestion no wheeze   Cardiovascular: regular rate and rhythm, S1 S2  Abdomen: soft, nontender, no organomegaly present, bowel sounds normal  Neurological:  alert and oriented x3, Cranial Nerves II-XII grossly intact  Skin:no increased ecchymosis/petechiae/purpura  Lymph Nodes: no palpable cervical/supraclavicular lymph nodes enlargements  Extremities: no cyanosis/clubbing/edema    Labs:         145  |  109<H>  |  21  ----------------------------<  149<H>  4.7   |  31  |  0.82    Ca    9.1      30 Aug 2017 06:36    TPro  8.3  /  Alb  3.2<L>  /  TBili  0.5  /  DBili  x   /  AST  17  /  ALT  13  /  AlkPhos  98                            12.5   7.2   )-----------( 283      ( 30 Aug 2017 06:36 )             38.5       PT/INR - ( 30 Aug 2017 06:36 )   PT: 12.3 sec;   INR: 1.13 ratio           Urinalysis Basic - ( 29 Aug 2017 18:11 )    Color: Yellow / Appearance: Clear / S.020 / pH: x  Gluc: x / Ketone: Negative  / Bili: Negative / Urobili: Negative mg/dL   Blood: x / Protein: 100 mg/dL / Nitrite: Negative   Leuk Esterase: Trace / RBC: 25-50 /HPF / WBC 6-10   Sq Epi: x / Non Sq Epi: Few / Bacteria: Many      LIVER FUNCTIONS - ( 29 Aug 2017 14:42 )  Alb: 3.2 g/dL / Pro: 8.3 g/dL / ALK PHOS: 98 U/L / ALT: 13 U/L DA / AST: 17 U/L / GGT: x             RECENT CULTURES:  aztreonam  IVPB 1000 milliGRAM(s) IV Intermittent every 8 hours  azithromycin  IVPB 500 milliGRAM(s) IV Intermittent every 24 hours    RADIOLOGY & ADDITIONAL STUDIES:  EXAM:  CT CHEST                                  PROCEDURE DATE:  2017          INTERPRETATION:  Clinical information: Pneumonia    No prior studies present for comparison.    Axial images obtained, coronal and sagittal images computer reformatted.   Noncontrast study.    Bronchiectasis present, especially in the right upper lobe. Cyst   formation-bronchiectasis right middle lobe which appears collapsed,   chronic appearing. Vague tree-in-bud opacities right lower lobe.   Calcified granuloma, right upper lobe.    Cyst formation minimal, left upper lobe ,region of cardiophrenic angle.   No suspicious appearing pulmonary nodules. Minimal effusion or pleural   thickening at the right lung base. No vascular congestion. Pleural   parenchymal scarring at the apices, right greater than left.        No pericardial effusion. No thoracic aortic aneurysm. No mediastinal or   hilar lesions, evaluation limited due to lack of IV contrast. Central   airway intact. Thyroid gland not enlarged. No acute osseous   abnormalities. Calcifications present in both adrenal glands. The spleen   is not enlarged. 2 mm right renal calcification.        IMPRESSION: Evidence of chronic lung disease. See additional findings as   described above.          Assessment :   86 yo female WITH HX OF COPD, BRONCHIECTASIS  ,PNEUMONIA  BIBA with   worsening congestion  doubt pneumonia  Likely COPD or bronchiectasis flare  ?bronchitis  Had reaction to Azactam    Plan :   Dc Azactam   Dc Zithromax  Doxycyline 100mg po bid x 5 days  Steroids per pulm  Pulm toileting  Increase activity      Continue with present regime .  Approptiate use of antibiotics and adverse effects reviewed.      I have discussed the above plan of care with patient/family in detail. They expressed understanding of the treatment plan . Risks, benefits and alternatives discussed in detail.   I have asked if they have any questions or concerns and appropriately addressed them to the best of my ability .      > 45 minutes spent in direct patient care reviewing  the notes, lab data/ imaging , discussion with multidisciplinary team. All questions were addressed and answered to the best of my capacity .    Thank you for allowing me to participate in the care of your patient .      Dena Vasquez MD  Infectious Disease  [phone]

## 2017-08-30 NOTE — PROGRESS NOTE ADULT - SUBJECTIVE AND OBJECTIVE BOX
VITALS/LABS  8/30/2017 afeb 90 96/57 18 96% 42   8/30/2017 W 7.2 Hb 12.5 Plt 283  Na 145 K 4.7 CO2 31 Cr .8   PROBLEM ASSESSMENT PLAN   PNEUMONIA V UTI  8/29/2017 UA 1020 Tr L estrase R 25-50 Many bact W 6-10   8/29/2017 CXR   1) Incr reticular density R perihilum with peribronchial thickening 2) This appearance extends to ru and rl lung fields 3) R hilum prominent Findings sugg pneumonia   Azithro (8/29) aztreonam (8/29)   PROMINENT R HILUM   8/29 CT Ch 1) Bectasis rul 2) Cyst Bectasis rml which appears collapsed 3) tib rll 4) calcified granuloma rul 5) Mini effsn or pl thickening r base 6) pleuroparenchymal scarring apices r greater   Will need followup CT ch in 4-6 m to escrtain stability  COPD   duoneb.4 (8/29) Steroids added Pulmicort (8/29) guaifenesin 200.3.3d (8/29) solumed 40 (8/29)   RO VTE   V duplex  8/29 n   MALNUTRITION   8/29 Alb 3.2   GLOBAL ISSUE/BEST PRACTICE:        PROBLEM:      Analgesia:     na                        PROBLEM: Sedation:     na               PROBLEM: HOB elevation:   y             PROBLEM: Stress ulcer proph:    pepcid 20 (8/29)                       PROBLEM: VTE prophylaxis:      lvnx 30 (8/29)                   PROBLEM: Glycemic control:    na   PROBLEM: Nutrition:    soft (8/29)           PROBLEM: Advanced directive: na     PROBLEM: Allergies:  na  HOSPITAL COURSE PLAN   86 yo female nonsmoker HO passive smoking () has home nebulizer Uses brovana No HO MI Baseline does not drive (out of choice) had been sick x 2 w pta Rxed with zpak then levaquin without relief with yellow phlegm No fever no chest pain no hemoptsyis   admitted [facility] S 8/29/2017 with poss pneumonia COPD ex  ER VITALS 8/29/2017 afeb 104 130/70 26 92%   PROBLEM ASSESSMENT PLAN   PNEUMONIA 8/29/2017 CXR R perihilar pneumonia   Rxed Azithro (8/29) aztreonam (8/29)   PROMINENT R HILUM  8/29 CT ch showed mainly chr looking changes   COPD  duoneb.4 (8/29) Steroids added   RO VTE  V duplex ordered 8/29  MALNUTRITION  8/29 Alb 3.2   TIME SPENT Over 25 minutes aggregate  care time spent on encounter; activities included   direct patient care, counseling and/or coordinating care reviewing notes, lab data/ imaging , discussion with multidisciplinary team/ patient  /family. Risks, benefits, alternatives  discussed in detail. Questions/concerns  were addressed  to the best of my ability . Patient seen and examined today Plan discussed/Questions answered  (with patient/ancillary staff/colleagues) Chart notes reviewd More detailed Pulm/Critical Care notes, assessment and plan  will be added later today as needed after reviewing further labs as they become available     Notable interval events reviewed    ROS/PE  . REVIEW OF SYMPTOMS    Able to give ROS  Yes     RELIABLE NO   Weakness Yes   Chills No   Vision changes No  Lymph nodes No enlarged glands   Endocrine No unexplained hair loss No het or cold intolerance   Allergy No hives   Sore throat No   Coughing blood No  Headache No  Confusion YES  Chest pain No  Palpitations No   Pain abdomen NO   Shortness of breath YES  Vomiting NO  Pain neck No  Pain abdomen NO       PHYSICAL EXAM    HEENT Unremarkable PERRLA atraumatic  RESP Fair air entry EXP prolonged    Harsh breath sound Resp distres mild  CARDIAC S1 S2 No S3     NO JVD   Awake Alert and ORIENTED x tw  ABDOMEN SOFT BS PRESENT NOT DISTENDED No hepatosplenomegaly  PEDAL EDEMA present No calf tenderness  NO rash GENERAL Not TOXIC looking  .   VITALS/LABS  8/30/2017 afeb 90 96/57 18 96% 42   8/30/2017 W 7.2 Hb 12.5 Plt 283  Na 145 K 4.7 CO2 31 Cr .8   PROBLEM ASSESSMENT PLAN   PNEUMONIA V UTI  8/29/2017 UA 1020 Tr L estrase R 25-50 Many bact W 6-10   8/29/2017 CXR   1) Incr reticular density R perihilum with peribronchial thickening 2) This appearance extends to ru and rl lung fields 3) R hilum prominent Findings sugg pneumonia   Azithro (8/29) aztreonam (8/29)   PROMINENT R HILUM   8/29 CT Ch 1) Bectasis rul 2) Cyst Bectasis rml which appears collapsed 3) tib rll 4) calcified granuloma rul 5) Mini effsn or pl thickening r base 6) pleuroparenchymal scarring apices r greater   Will need followup CT ch in 4-6 m to escrtain stability  COPD   duoneb.4 (8/29) Steroids added Pulmicort (8/29) guaifenesin 200.3.3d (8/29) solumed 40 (8/29)   RO VTE   V duplex  8/29 n   MALNUTRITION   8/29 Alb 3.2   GLOBAL ISSUE/BEST PRACTICE:        PROBLEM:      Analgesia:     na                        PROBLEM: Sedation:     na               PROBLEM: HOB elevation:   y             PROBLEM: Stress ulcer proph:    pepcid 20 (8/29)                       PROBLEM: VTE prophylaxis:      lvnx 30 (8/29)                   PROBLEM: Glycemic control:    na   PROBLEM: Nutrition:    soft (8/29)           PROBLEM: Advanced directive: na     PROBLEM: Allergies:  na  HOSPITAL COURSE PLAN   86 yo female nonsmoker HO passive smoking () has home nebulizer Uses brovana No HO MI Baseline does not drive (out of choice) had been sick x 2 w pta Rxed with zpak then levaquin without relief with yellow phlegm No fever no chest pain no hemoptsyis   admitted [facility] S 8/29/2017 with poss pneumonia COPD ex  ER VITALS 8/29/2017 afeb 104 130/70 26 92%   PROBLEM ASSESSMENT PLAN   PNEUMONIA 8/29/2017 CXR R perihilar pneumonia   Rxed Azithro (8/29) aztreonam (8/29)   PROMINENT R HILUM  8/29 CT ch showed mainly chr looking changes   COPD  duoneb.4 (8/29) Steroids added   RO VTE  V duplex ordered 8/29  MALNUTRITION  8/29 Alb 3.2   TIME SPENT Over 25 minutes aggregate  care time spent on encounter; activities included   direct patient care, counseling and/or coordinating care reviewing notes, lab data/ imaging , discussion with multidisciplinary team/ patient  /family. Risks, benefits, alternatives  discussed in detail. Questions/concerns  were addressed  to the best of my ability .

## 2017-08-30 NOTE — PHYSICAL THERAPY INITIAL EVALUATION ADULT - ADDITIONAL COMMENTS
Lives in house with son.  6 stairs to enter with railing; split level home..6 stairs to bedrooms with railing.

## 2017-08-31 ENCOUNTER — TRANSCRIPTION ENCOUNTER (OUTPATIENT)
Age: 82
End: 2017-08-31

## 2017-08-31 VITALS
SYSTOLIC BLOOD PRESSURE: 103 MMHG | RESPIRATION RATE: 18 BRPM | HEART RATE: 91 BPM | DIASTOLIC BLOOD PRESSURE: 58 MMHG | OXYGEN SATURATION: 92 % | TEMPERATURE: 99 F

## 2017-08-31 LAB
ANION GAP SERPL CALC-SCNC: 4 MMOL/L — LOW (ref 5–17)
BUN SERPL-MCNC: 23 MG/DL — SIGNIFICANT CHANGE UP (ref 7–23)
CALCIUM SERPL-MCNC: 9 MG/DL — SIGNIFICANT CHANGE UP (ref 8.4–10.5)
CHLORIDE SERPL-SCNC: 109 MMOL/L — HIGH (ref 96–108)
CO2 SERPL-SCNC: 32 MMOL/L — HIGH (ref 22–31)
CREAT SERPL-MCNC: 0.77 MG/DL — SIGNIFICANT CHANGE UP (ref 0.5–1.3)
GLUCOSE SERPL-MCNC: 163 MG/DL — HIGH (ref 70–99)
HCT VFR BLD CALC: 34.4 % — LOW (ref 34.5–45)
HGB BLD-MCNC: 11.4 G/DL — LOW (ref 11.5–15.5)
MCHC RBC-ENTMCNC: 31.6 PG — SIGNIFICANT CHANGE UP (ref 27–34)
MCHC RBC-ENTMCNC: 33.3 GM/DL — SIGNIFICANT CHANGE UP (ref 32–36)
MCV RBC AUTO: 94.7 FL — SIGNIFICANT CHANGE UP (ref 80–100)
PLATELET # BLD AUTO: 269 K/UL — SIGNIFICANT CHANGE UP (ref 150–400)
POTASSIUM SERPL-MCNC: 4.4 MMOL/L — SIGNIFICANT CHANGE UP (ref 3.5–5.3)
POTASSIUM SERPL-SCNC: 4.4 MMOL/L — SIGNIFICANT CHANGE UP (ref 3.5–5.3)
RBC # BLD: 3.63 M/UL — LOW (ref 3.8–5.2)
RBC # FLD: 12.8 % — SIGNIFICANT CHANGE UP (ref 10.3–14.5)
SODIUM SERPL-SCNC: 145 MMOL/L — SIGNIFICANT CHANGE UP (ref 135–145)
WBC # BLD: 8.5 K/UL — SIGNIFICANT CHANGE UP (ref 3.8–10.5)
WBC # FLD AUTO: 8.5 K/UL — SIGNIFICANT CHANGE UP (ref 3.8–10.5)

## 2017-08-31 RX ORDER — ALBUTEROL 90 UG/1
0 AEROSOL, METERED ORAL
Qty: 0 | Refills: 0 | COMMUNITY
Start: 2017-08-31

## 2017-08-31 RX ORDER — ACETAMINOPHEN 500 MG
2 TABLET ORAL
Qty: 0 | Refills: 0 | COMMUNITY
Start: 2017-08-31

## 2017-08-31 RX ORDER — BUDESONIDE, MICRONIZED 100 %
1 POWDER (GRAM) MISCELLANEOUS
Qty: 60 | Refills: 0 | OUTPATIENT
Start: 2017-08-31 | End: 2017-09-30

## 2017-08-31 RX ADMIN — FAMOTIDINE 20 MILLIGRAM(S): 10 INJECTION INTRAVENOUS at 11:51

## 2017-08-31 RX ADMIN — Medication 200 MILLIGRAM(S): at 06:06

## 2017-08-31 RX ADMIN — Medication 100 MILLIGRAM(S): at 06:06

## 2017-08-31 RX ADMIN — Medication 100 MILLIGRAM(S): at 17:01

## 2017-08-31 RX ADMIN — Medication 200 MILLIGRAM(S): at 14:25

## 2017-08-31 RX ADMIN — Medication 0.5 MILLIGRAM(S): at 07:54

## 2017-08-31 RX ADMIN — Medication 3 MILLILITER(S): at 07:54

## 2017-08-31 RX ADMIN — Medication 3 MILLILITER(S): at 14:02

## 2017-08-31 NOTE — DISCHARGE NOTE ADULT - HOSPITAL COURSE
86 yo female nonsmoker HO passive smoking () has home nebulizer Uses brovana No HO MI Baseline does not drive (out of choice) had been sick x 2 w pta Rxed with zpak then levaquin without relief with yellow phlegm No fever no chest pain no hemoptsyis   admitted [facility] S 8/29/2017 with poss pneumonia COPD ex  ER VITALS 8/29/2017 afeb 104 130/70 26 92%   PROBLEM ASSESSMENT PLAN   RESP RA PO on 8/31 was 91%  PNEUMONIA 8/29/2017 CXR R perihilar pneumonia   Rxed Azithro (8/29) aztreonam (8/29) Pct was .08 (8/30) BC neg 8/30  Abio changed to doxy by Dr Vasquez 8/30)   PROMINENT R HILUM  8/29 CT ch showed mainly chr looking changes  ECHO 8/30 N LVSF RVSP 35  COPD  duoneb.4 (8/29) Steroids added   RO VTE  V duplex  8/29 n   MALNUTRITION  8/29 Alb 3.2   DC planning 8/31 or 9/1 on Pred 30 taper over 6 d RESP   8/31 RA 91%  PNEUMONIA V UTI  8/29/2017 UA 1020 Tr L estrase R 25-50 Many bact W 6-10   8/29/2017 CXR   1) Incr reticular density R perihilum with peribronchial thickening 2) This appearance extends to ru and rl lung fields 3) R hilum prominent Findings sugg pneumonia   Doxy 100.2.5 (8/30)   Azithro (8/29-8/30) aztreonam (8/29-8/30)   8/30 bc n 8/31 pct .08   PROMINENT R HILUM   8/29 CT Ch 1) Bectasis rul 2) Cyst Bectasis rml which appears collapsed 3) tib rll 4) calcified granuloma rul 5) Mini effsn or pl thickening r base 6) pleuroparenchymal scarring apices r greater   Will need followup CT ch in 4-6 m to escrtain stability  ECHO 8/30 N R and L ventricular systolic function RVSP 35   COPD   duoneb.4 (8/29) Steroids added Pulmicort (8/29) guaifenesin 200.3.3d (8/29) solumed 40 (8/29)   RO VTE   V duplex  8/29 neg Continue nebulised bronchodilators, tapering schedule of steroids, complete oral antibiotics, antitussives prn, followup with PMD IN 1-2 weeks Folloeup with Dr Burgos in 1-2 weeks

## 2017-08-31 NOTE — DISCHARGE NOTE ADULT - PLAN OF CARE
resolution of sob continue current managmnet and medications Continue nebulised bronchodilators, tapering schedule of steroids, complete oral antibiotics, antitussives prn, followup with PMD IN 1-2 weeks complete 5 days of po abx continue home medications Continue nebulised bronchodilators, tapering schedule of steroids, complete oral antibiotics, antitussives prn, followup with PMD IN 1-2 weeks continue current managmnet and medications

## 2017-08-31 NOTE — DISCHARGE NOTE ADULT - PATIENT PORTAL LINK FT
“You can access the [employer] Patient Portal, offered by [facility], by registering with the following website: [email or web address]”

## 2017-08-31 NOTE — DISCHARGE NOTE ADULT - CARE PROVIDER_API CALL
Dirk Burgos), Internal Medicine; [place] [address]  Phone: [phone]  Fax: [phone]

## 2017-08-31 NOTE — DISCHARGE NOTE ADULT - CARE PLAN
Principal Discharge DX:	COPD (chronic obstructive pulmonary disease)  Goal:	resolution of sob  Instructions for follow-up, activity and diet:	continue current managmnet and medications Continue nebulised bronchodilators, tapering schedule of steroids, complete oral antibiotics, antitussives prn, followup with PMD IN 1-2 weeks  Secondary Diagnosis:	Pneumonia  Instructions for follow-up, activity and diet:	complete 5 days of po abx  Secondary Diagnosis:	Bronchiectasis  Instructions for follow-up, activity and diet:	continue home medications Continue nebulised bronchodilators, tapering schedule of steroids, complete oral antibiotics, antitussives prn, followup with PMD IN 1-2 weeks  Secondary Diagnosis:	Prophylactic measure  Instructions for follow-up, activity and diet:	continue current managmnet and medications

## 2017-08-31 NOTE — DISCHARGE NOTE ADULT - MEDICATION SUMMARY - MEDICATIONS TO TAKE
I will START or STAY ON the medications listed below when I get home from the hospital:    predniSONE 10 mg oral tablet  -- 3 tab(s) by mouth once a day x 2 days  2 tab(s) by mouth once a day x 2 days  1 tab(s) by mouth once a day x 2 days  -- It is very important that you take or use this exactly as directed.  Do not skip doses or discontinue unless directed by your doctor.  Obtain medical advice before taking any non-prescription drugs as some may affect the action of this medication.  Take with food or milk.    -- Indication: For COPD (chronic obstructive pulmonary disease)    budesonide 0.5 mg/2 mL inhalation suspension  -- 1 puff(s) inhaled 2 times a day    -J 44.9  -- Indication: For COPD (chronic obstructive pulmonary disease)    acetaminophen 325 mg oral tablet  -- 2 tab(s) by mouth every 6 hours, As needed, For Temp greater than 38 C (100.4 F)  -- Indication: For COPD (chronic obstructive pulmonary disease)    acetaminophen 325 mg oral tablet  -- 2 tab(s) by mouth every 6 hours, As needed, Mild Pain (1 - 3) OTC  -- Indication: For PAIN     doxycycline monohydrate 100 mg oral capsule  -- 1 cap(s) by mouth every 12 hours  -- Indication: For Pneumonia    albuterol 0.63 mg/3 mL (0.021%) inhalation solution  --  inhaled 3 times a day  -- Indication: For COPD (chronic obstructive pulmonary disease)    guaiFENesin 100 mg/5 mL oral liquid  -- 5 milliliter(s) by mouth every 6 hours, As Needed OTC  -- Indication: For COPD (chronic obstructive pulmonary disease) I will START or STAY ON the medications listed below when I get home from the hospital:    predniSONE 10 mg oral tablet  -- 3 tab(s) by mouth once a day x 2 days  2 tab(s) by mouth once a day x 2 days  1 tab(s) by mouth once a day x 2 days  -- It is very important that you take or use this exactly as directed.  Do not skip doses or discontinue unless directed by your doctor.  Obtain medical advice before taking any non-prescription drugs as some may affect the action of this medication.  Take with food or milk.    -- Indication: For COPD (chronic obstructive pulmonary disease)    budesonide 0.5 mg/2 mL inhalation suspension  -- 1 puff(s) inhaled 2 times a day    -J 44.9  -- Indication: For COPD (chronic obstructive pulmonary disease)    acetaminophen 325 mg oral tablet  -- 2 tab(s) by mouth every 6 hours, As needed, For Temp greater than 38 C (100.4 F)  -- Indication: For COPD (chronic obstructive pulmonary disease)    acetaminophen 325 mg oral tablet  -- 2 tab(s) by mouth every 6 hours, As needed, Mild Pain (1 - 3) OTC  -- Indication: For PAIN     doxycycline monohydrate 100 mg oral capsule  -- 1 cap(s) by mouth every 12 hours  -- Indication: For Pneumonia    albuterol 0.63 mg/3 mL (0.021%) inhalation solution  --  inhaled 3 times a day  -- Indication: For COPD (chronic obstructive pulmonary disease)    guaiFENesin 100 mg/5 mL oral liquid  -- 5 milliliter(s) by mouth every 6 hours, As Needed OTC  -- Indication: For COPD (chronic obstructive pulmonary disease)    PriLOSEC OTC 20 mg oral delayed release tablet  -- 1 tab(s) by mouth once a day  -- Indication: For Prophylactic measure

## 2017-08-31 NOTE — PROGRESS NOTE ADULT - SUBJECTIVE AND OBJECTIVE BOX
Chief Complaint: Cough, shortness of breath    Interval Events: No events overnight. No complaints this morning.    Review of Systems:  General: No fevers, chills, weight loss or gain  Skin: No rashes, color changes  Cardiovascular: No chest pain, orthopnea  Respiratory: No shortness of breath, cough  Gastrointestinal: No nausea, abdominal pain  Genitourinary: No incontinence, pain with urination  Musculoskeletal: No pain, swelling, decreased range of motion  Neurological: No headache, weakness  Psychiatric: No depression, anxiety  Endocrine: No weight loss or gain, increased thirst  All other systems are comprehensively negative.    Physical Exam:  Vitals:        Vital Signs Last 24 Hrs  T(C): 36.7 (31 Aug 2017 09:26), Max: 36.8 (30 Aug 2017 13:10)  T(F): 98.1 (31 Aug 2017 09:26), Max: 98.3 (30 Aug 2017 13:10)  HR: 91 (31 Aug 2017 09:33) (78 - 91)  BP: 112/56 (31 Aug 2017 09:33) (105/57 - 119/66)  BP(mean): --  RR: 18 (31 Aug 2017 09:33) (16 - 18)  SpO2: 92% (31 Aug 2017 09:33) (91% - 98%)  General: NAD  Neck: No JVD, no carotid bruit  Lungs: CTAB  CV: RRR, nl S1/S2, no M/R/G  Abdomen: S/NT/ND, +BS  Extremities: No LE edema, no cyanosis    Labs:                        11.4   8.5   )-----------( 269      ( 31 Aug 2017 06:49 )             34.4     08-31    145  |  109<H>  |  23  ----------------------------<  163<H>  4.4   |  32<H>  |  0.77    Ca    9.0      31 Aug 2017 06:49    TPro  8.3  /  Alb  3.2<L>  /  TBili  0.5  /  DBili  x   /  AST  17  /  ALT  13  /  AlkPhos  98  08-29        PT/INR - ( 30 Aug 2017 06:36 )   PT: 12.3 sec;   INR: 1.13 ratio             Telemetry: Sinus rhythm, intermittent atrial runs

## 2017-08-31 NOTE — PROGRESS NOTE ADULT - SUBJECTIVE AND OBJECTIVE BOX
CIRO SALINAS is a 87yFemale , patient examined and chart reviewed.     INTERVAL HPI/ OVERNIGHT EVENTS:   Afebrile. Cough improved. Feeling better    PAST MEDICAL & SURGICAL HISTORY:  Bronchiectasis  Gallstone  COPD (chronic obstructive pulmonary disease)      For details regarding the patient's social history, family history, and other miscellaneous elements, please refer the initial infectious diseases consultation and/or the admitting history and physical examination for this admission.      ROS:  CONSTITUTIONAL:  Negative fever or chills,   EYES:  Negative  blurry vision or double vision  CARDIOVASCULAR:  Negative for chest pain or palpitations  RESPIRATORY:  +cough, No wheezing, or SOB   GASTROINTESTINAL:  Negative for nausea, vomiting, diarrhea, constipation, or abdominal pain  GENITOURINARY:  Negative frequency, urgency or dysuria  NEUROLOGIC:  No headache, confusion, dizziness, lightheadedness  All other systems were reviewed and are negative     Current inpatient medications :    ANTIBIOTICS/RELEVANT:  doxycycline hyclate Capsule 100 milliGRAM(s) Oral every 12 hours      ALBUTerol/ipratropium for Nebulization 3 milliLiter(s) Nebulizer every 6 hours  acetaminophen   Tablet 650 milliGRAM(s) Oral every 6 hours PRN  acetaminophen   Tablet. 650 milliGRAM(s) Oral every 6 hours PRN  guaiFENesin    Syrup 200 milliGRAM(s) Oral every 8 hours  sodium chloride 0.9%. 1000 milliLiter(s) IV Continuous <Continuous>  enoxaparin Injectable 30 milliGRAM(s) SubCutaneous daily  famotidine    Tablet 20 milliGRAM(s) Oral daily  buDESOnide   0.5 milliGRAM(s) Respule 0.5 milliGRAM(s) Inhalation two times a day  methylPREDNISolone sodium succinate Injectable 40 milliGRAM(s) IV Push daily      Objective:     @  @ 07:00  --------------------------------------------------------  IN: 1000 mL / OUT: 1 mL / NET: 999 mL     @ : @ 17:48  --------------------------------------------------------  IN: 280 mL / OUT: 0 mL / NET: 280 mL      T(C): 37.3 (17 @ 16:59), Max: 37.3 (17 @ 16:59)  HR: 91 (17 @ 16:59) (78 - 94)  BP: 103/58 (17 @ 16:59) (103/58 - 126/62)  RR: 18 (17 @ 16:59) (16 - 18)  SpO2: 92% (17 @ 16:59) (91% - 97%)  Wt(kg): --      Physical Exam:  General: No acute distress  Eyes: sclera anicteric, pupils equal and reactive to light  ENMT: buccal mucosa moist, pharynx not injected  Neck: supple, trachea midline  Lungs: Decreased, no wheeze/rhonchi  Cardiovascular: regular rate and rhythm, S1 S2  Abdomen: soft, nontender, no organomegaly present, bowel sounds normal  Neurological: alert and oriented x3, Cranial Nerves II-XII grossly intact  Skin: no increased ecchymosis/petechiae/purpura  Extremities: no cyanosis/clubbing/edema      LABS:                          11.4   8.5   )-----------( 269      ( 31 Aug 2017 06:49 )             34.4       08-    145  |  109<H>  |  23  ----------------------------<  163<H>  4.4   |  32<H>  |  0.77    Ca    9.0      31 Aug 2017 06:49        PT/INR - ( 30 Aug 2017 06:36 )   PT: 12.3 sec;   INR: 1.13 ratio           Urinalysis Basic - ( 29 Aug 2017 18:11 )    Color: Yellow / Appearance: Clear / S.020 / pH: x  Gluc: x / Ketone: Negative  / Bili: Negative / Urobili: Negative mg/dL   Blood: x / Protein: 100 mg/dL / Nitrite: Negative   Leuk Esterase: Trace / RBC: 25-50 /HPF / WBC 6-10   Sq Epi: x / Non Sq Epi: Few / Bacteria: Many      RECENT CULTURES:  Culture - Blood (17 @ 00:40)    Specimen Source: .Blood Blood    Culture Results:   No growth to date.    Culture - Urine (17 @ 00:23)    Specimen Source: .Urine Clean Catch (Midstream)    Culture Results:   10,000 - 49,000 CFU/mL Coag Negative Staphylococcus  <10,000 CFU/ml Normal Urogenital mona present    RADIOLOGY & ADDITIONAL STUDIES:  EXAM:  CHEST (SINGLE AP PA)                                  PROCEDURE DATE:  2017          INTERPRETATION:  Clinical information: Cough. Shortness of breath.    There is no previous chest from for comparison.    Single AP film of the chest is submitted.    Findings: It is difficult to  heart size on this AP film but likely   within normal limits. There is calcification of the aortic arch. There is   prominence of the right hilum.    The left lung is clear.    There is increased reticular density noted at the right perihilum with   peribronchial thickening. This appearance extends to the right upper   right lower lung fields. Findings are suggestive of pneumonia. No   previous chest film is available for comparison to assess for chronicity.    There is degenerative change seen of the visualized spine.     Impression: Increased reticular density noted at the right perihilum with   peribronchial cuffing extending peripherally. Right hilum is prominent.   Findings are suggestiveof pneumonia. This no previous chest film for   comparison. Please clinically correlate and obtain CT examination of the   chest performed.        EXAM:  CT CHEST                            PROCEDURE DATE:  2017          INTERPRETATION:  Clinical information: Pneumonia    No prior studies present for comparison.    Axial images obtained, coronal and sagittal images computer reformatted.   Noncontrast study.    Bronchiectasis present, especially in the right upper lobe. Cyst   formation-bronchiectasis right middle lobe which appears collapsed,   chronic appearing. Vague tree-in-bud opacities right lower lobe.   Calcified granuloma, right upper lobe.    Cyst formation minimal, left upper lobe ,region of cardiophrenic angle.   No suspicious appearing pulmonary nodules. Minimal effusion or pleural   thickening at the right lung base. No vascular congestion. Pleural   parenchymal scarring at the apices, right greater than left.        No pericardial effusion. No thoracic aortic aneurysm. No mediastinal or   hilar lesions, evaluation limited due to lack of IV contrast. Central   airway intact. Thyroid gland not enlarged. No acute osseous   abnormalities. Calcifications present in both adrenal glands. The spleen   is not enlarged. 2 mm right renal calcification.        IMPRESSION: Evidence of chronic lung disease. See additional findings as   described above.        Assessment :   86 yo female WITH HX OF COPD, BRONCHIECTASIS  ,PNEUMONIA  BIBA with   worsening congestion  doubt pneumonia  Likely COPD or bronchiectasis flare  ?bronchitis  Had reaction to Azactam  Clinically better    Plan :   Doxycyline 100mg po bid x 5 days  Steroids per pulm  Pulm toileting  Increase activity  Dc home    I have discussed the above plan of care with patient in detail. She expressed understanding of the  treatment plan . Risks, benefits and alternatives discussed in detail.   I have asked if she has any questions or concerns and appropriately addressed them to the best of my ability .    > 35 minutes were spent in direct patient care reviewing notes, medications ,labs data/ imaging , discussion with multidisciplinary team.    Thank you for allowing me to participate in care of your patient .    Dena Vasquez MD  Infectious Disease  [phone]

## 2017-08-31 NOTE — PROGRESS NOTE ADULT - SUBJECTIVE AND OBJECTIVE BOX
Patient was seen and examined on a day of discharge . Plan of care , discharge medications and recommendations discussed with consultants and clearance for discharge obtained .Social service , case managment  and medical staff are aware of plan. Family is notified,spoke to the daughter Sandy on a phone . Discharge summary  is  prepared electronically

## 2017-08-31 NOTE — PROGRESS NOTE ADULT - SUBJECTIVE AND OBJECTIVE BOX
VITALS/LABS  8/31/2017 afeb 84 116/61 16 94%   8/31/2017 W 8.5 Hb 11.4 Plt 269 Na 145 K 4.4 CO2 32 Cr .7   PROBLEM ASSESSMENT PLAN   RESP   8/31 RA 91%  PNEUMONIA V UTI  8/29/2017 UA 1020 Tr L estrase R 25-50 Many bact W 6-10   8/29/2017 CXR   1) Incr reticular density R perihilum with peribronchial thickening 2) This appearance extends to ru and rl lung fields 3) R hilum prominent Findings sugg pneumonia   Doxy 100.2.5 (8/30)   Azithro (8/29-8/30) aztreonam (8/29-8/30)   8/30 bc n 8/31 pct .08   PROMINENT R HILUM   8/29 CT Ch 1) Bectasis rul 2) Cyst Bectasis rml which appears collapsed 3) tib rll 4) calcified granuloma rul 5) Mini effsn or pl thickening r base 6) pleuroparenchymal scarring apices r greater   Will need followup CT ch in 4-6 m to escrtain stability  ECHO 8/30 N R and L ventricular systolic function RVSP 35   COPD   duoneb.4 (8/29) Steroids added Pulmicort (8/29) guaifenesin 200.3.3d (8/29) solumed 40 (8/29)   RO VTE   V duplex  8/29 n   MALNUTRITION   8/29 Alb 3.2   GLOBAL ISSUE/BEST PRACTICE:        PROBLEM:      Analgesia:     na                        PROBLEM: Sedation:     na               PROBLEM: HOB elevation:   y             PROBLEM: Stress ulcer proph:    pepcid 20 (8/29)                       PROBLEM: VTE prophylaxis:      lvnx 30 (8/29)                   PROBLEM: Glycemic control:    na   PROBLEM: Nutrition:    soft (8/29)           PROBLEM: Advanced directive: na     PROBLEM: Allergies:  na  HOSPITAL COURSE PLAN   86 yo female nonsmoker HO passive smoking () has home nebulizer Uses brovana No HO MI Baseline does not drive (out of choice) had been sick x 2 w pta Rxed with zpak then levaquin without relief with yellow phlegm No fever no chest pain no hemoptsyis   admitted [facility] 8/29/2017 with poss pneumonia COPD ex  ER VITALS 8/29/2017 afeb 104 130/70 26 92%   PROBLEM ASSESSMENT PLAN   RESP RA PO on 8/31 was 91%  PNEUMONIA 8/29/2017 CXR R perihilar pneumonia   Rxed Azithro (8/29) aztreonam (8/29) Pct was .08 (8/30) BC neg 8/30  Abio changed to doxy by Dr Vasquez 8/30)   PROMINENT R HILUM  8/29 CT ch showed mainly chr looking changes  ECHO 8/30 N LVSF RVSP 35  COPD  duoneb.4 (8/29) Steroids added   RO VTE  V duplex  8/29 n   MALNUTRITION  8/29 Alb 3.2   DC planning 8/31 or 9/1 on Pred 30 taper over 6 d   TIME SPENT Over 25 minutes aggregate  care time spent on encounter; activities included   direct patient care, counseling and/or coordinating care reviewing notes, lab data/ imaging , discussion with multidisciplinary team/ patient  /family. Risks, benefits, alternatives  discussed in detail. Questions/concerns  were addressed  to the best of my ability .

## 2017-08-31 NOTE — PROGRESS NOTE ADULT - ASSESSMENT
The patient is an 87 year old female with a history of COPD, bronchiectasis who presents with cough and shortness of breath in the setting of pneumonia, bronchiectasis and COPD.    Plan:  - Echocardiogram with normal LV systolic function, no significant valve issues  - CT chest findings noted; evidence of chronic lung disease, no vascular congestion  - LE venous duplex negative for DVT  - On doxycycline  - Pulm follow-up
88 yo female WITH HX OF COPD, BRONCHIECTASIS  ,PNEUMONIA  BIBA  presents with worsening productive cough, congestion,sob and montero . as per son  at bedside patient began have having cough last about 2 weeks ag denies, fever,  was seen in urgent care diagnosed pharyngitis, prescribed zpak, completed that medication, was seen by pmd Dr Dirk Burgos on Tuesday, was started on Levaquin, also is on OTC  delsym for cough with no improvement .Patient is   nonsmoker.  patient has nebulizer at home.and was doing respiratory tx  Chest xray demonstarted pneumonia and iv abx started Seen by Dr Hansen ,pulmonologist ( covering ) Admitted for septic workup and evaluation,send blood and urine cx,serial lactate levels,monitor vitals closley,ivfs hydration,monitor urine output and renal profile,iv abx initiated

## 2017-09-01 LAB — S PNEUM AG SER QL: SIGNIFICANT CHANGE UP

## 2017-09-01 RX ORDER — ALBUTEROL 90 UG/1
0 AEROSOL, METERED ORAL
Qty: 0 | Refills: 0 | COMMUNITY

## 2017-09-04 LAB
CULTURE RESULTS: SIGNIFICANT CHANGE UP
CULTURE RESULTS: SIGNIFICANT CHANGE UP
SPECIMEN SOURCE: SIGNIFICANT CHANGE UP
SPECIMEN SOURCE: SIGNIFICANT CHANGE UP

## 2017-09-10 ENCOUNTER — INPATIENT (INPATIENT)
Facility: HOSPITAL | Age: 82
LOS: 2 days | Discharge: NURSING, REHAB OR LONG-TERM CARE | DRG: 192 | End: 2017-09-13
Attending: INTERNAL MEDICINE | Admitting: INTERNAL MEDICINE
Payer: MEDICARE

## 2017-09-10 VITALS
WEIGHT: 89.95 LBS | HEART RATE: 106 BPM | DIASTOLIC BLOOD PRESSURE: 70 MMHG | TEMPERATURE: 98 F | OXYGEN SATURATION: 90 % | SYSTOLIC BLOOD PRESSURE: 120 MMHG | RESPIRATION RATE: 20 BRPM

## 2017-09-10 DIAGNOSIS — J44.9 CHRONIC OBSTRUCTIVE PULMONARY DISEASE, UNSPECIFIED: ICD-10-CM

## 2017-09-10 DIAGNOSIS — J44.0 CHRONIC OBSTRUCTIVE PULMONARY DISEASE WITH (ACUTE) LOWER RESPIRATORY INFECTION: ICD-10-CM

## 2017-09-10 DIAGNOSIS — I25.5 ISCHEMIC CARDIOMYOPATHY: ICD-10-CM

## 2017-09-10 DIAGNOSIS — J47.0 BRONCHIECTASIS WITH ACUTE LOWER RESPIRATORY INFECTION: ICD-10-CM

## 2017-09-10 LAB
ALBUMIN SERPL ELPH-MCNC: 3.2 G/DL — LOW (ref 3.3–5)
ALP SERPL-CCNC: 107 U/L — SIGNIFICANT CHANGE UP (ref 30–120)
ALT FLD-CCNC: 23 U/L DA — SIGNIFICANT CHANGE UP (ref 10–60)
ANION GAP SERPL CALC-SCNC: 7 MMOL/L — SIGNIFICANT CHANGE UP (ref 5–17)
APPEARANCE UR: CLEAR — SIGNIFICANT CHANGE UP
APTT BLD: 31 SEC — SIGNIFICANT CHANGE UP (ref 27.5–37.4)
AST SERPL-CCNC: 45 U/L — HIGH (ref 10–40)
BACTERIA # UR AUTO: ABNORMAL
BASE EXCESS BLDA CALC-SCNC: 3.3 MMOL/L — HIGH (ref -2–2)
BASOPHILS # BLD AUTO: 0.2 K/UL — SIGNIFICANT CHANGE UP (ref 0–0.2)
BASOPHILS NFR BLD AUTO: 1.2 % — SIGNIFICANT CHANGE UP (ref 0–2)
BILIRUB SERPL-MCNC: 1.2 MG/DL — SIGNIFICANT CHANGE UP (ref 0.2–1.2)
BILIRUB UR-MCNC: NEGATIVE — SIGNIFICANT CHANGE UP
BLOOD GAS COMMENTS: SIGNIFICANT CHANGE UP
BLOOD GAS SOURCE: SIGNIFICANT CHANGE UP
BUN SERPL-MCNC: 22 MG/DL — SIGNIFICANT CHANGE UP (ref 7–23)
CALCIUM SERPL-MCNC: 9.6 MG/DL — SIGNIFICANT CHANGE UP (ref 8.4–10.5)
CHLORIDE SERPL-SCNC: 101 MMOL/L — SIGNIFICANT CHANGE UP (ref 96–108)
CO2 SERPL-SCNC: 30 MMOL/L — SIGNIFICANT CHANGE UP (ref 22–31)
COD CRY URNS QL: ABNORMAL
COLOR SPEC: YELLOW — SIGNIFICANT CHANGE UP
CREAT SERPL-MCNC: 0.84 MG/DL — SIGNIFICANT CHANGE UP (ref 0.5–1.3)
DIFF PNL FLD: ABNORMAL
EOSINOPHIL # BLD AUTO: 0.1 K/UL — SIGNIFICANT CHANGE UP (ref 0–0.5)
EOSINOPHIL NFR BLD AUTO: 0.5 % — SIGNIFICANT CHANGE UP (ref 0–6)
EPI CELLS # UR: SIGNIFICANT CHANGE UP
GLUCOSE SERPL-MCNC: 135 MG/DL — HIGH (ref 70–99)
GLUCOSE UR QL: NEGATIVE MG/DL — SIGNIFICANT CHANGE UP
HCO3 BLDA-SCNC: 27 MMOL/L — SIGNIFICANT CHANGE UP (ref 21–29)
HCT VFR BLD CALC: 44.2 % — SIGNIFICANT CHANGE UP (ref 34.5–45)
HGB BLD-MCNC: 14.2 G/DL — SIGNIFICANT CHANGE UP (ref 11.5–15.5)
HOROWITZ INDEX BLDA+IHG-RTO: 0 — SIGNIFICANT CHANGE UP
INR BLD: 1.08 RATIO — SIGNIFICANT CHANGE UP (ref 0.88–1.16)
KETONES UR-MCNC: NEGATIVE — SIGNIFICANT CHANGE UP
LACTATE SERPL-SCNC: 1.2 MMOL/L — SIGNIFICANT CHANGE UP (ref 0.7–2)
LEUKOCYTE ESTERASE UR-ACNC: ABNORMAL
LYMPHOCYTES # BLD AUTO: 1.5 K/UL — SIGNIFICANT CHANGE UP (ref 1–3.3)
LYMPHOCYTES # BLD AUTO: 9.6 % — LOW (ref 13–44)
MCHC RBC-ENTMCNC: 31.3 PG — SIGNIFICANT CHANGE UP (ref 27–34)
MCHC RBC-ENTMCNC: 32.3 GM/DL — SIGNIFICANT CHANGE UP (ref 32–36)
MCV RBC AUTO: 97.1 FL — SIGNIFICANT CHANGE UP (ref 80–100)
MONOCYTES # BLD AUTO: 1.1 K/UL — HIGH (ref 0–0.9)
MONOCYTES NFR BLD AUTO: 6.9 % — SIGNIFICANT CHANGE UP (ref 2–14)
NEUTROPHILS # BLD AUTO: 12.9 K/UL — HIGH (ref 1.8–7.4)
NEUTROPHILS NFR BLD AUTO: 81.8 % — HIGH (ref 43–77)
NITRITE UR-MCNC: NEGATIVE — SIGNIFICANT CHANGE UP
PCO2 BLDA: 48 MMHG — HIGH (ref 32–46)
PH BLD: 7.38 — SIGNIFICANT CHANGE UP (ref 7.35–7.45)
PH UR: 5 — SIGNIFICANT CHANGE UP (ref 5–8)
PLATELET # BLD AUTO: 393 K/UL — SIGNIFICANT CHANGE UP (ref 150–400)
PO2 BLDA: 84 MMHG — SIGNIFICANT CHANGE UP (ref 74–108)
POTASSIUM SERPL-MCNC: 5.6 MMOL/L — HIGH (ref 3.5–5.3)
POTASSIUM SERPL-SCNC: 5.6 MMOL/L — HIGH (ref 3.5–5.3)
PROCALCITONIN SERPL-MCNC: 0.06 NG/ML — HIGH (ref 0–0.04)
PROT SERPL-MCNC: 8.1 G/DL — SIGNIFICANT CHANGE UP (ref 6–8.3)
PROT UR-MCNC: 100 MG/DL
PROTHROM AB SERPL-ACNC: 11.8 SEC — SIGNIFICANT CHANGE UP (ref 9.8–12.7)
RBC # BLD: 4.55 M/UL — SIGNIFICANT CHANGE UP (ref 3.8–5.2)
RBC # FLD: 13.2 % — SIGNIFICANT CHANGE UP (ref 10.3–14.5)
RBC CASTS # UR COMP ASSIST: >50 /HPF (ref 0–4)
SAO2 % BLDA: 97 % — HIGH (ref 92–96)
SODIUM SERPL-SCNC: 138 MMOL/L — SIGNIFICANT CHANGE UP (ref 135–145)
SP GR SPEC: 1.02 — SIGNIFICANT CHANGE UP (ref 1.01–1.02)
UROBILINOGEN FLD QL: 1 MG/DL
WBC # BLD: 15.8 K/UL — HIGH (ref 3.8–10.5)
WBC # FLD AUTO: 15.8 K/UL — HIGH (ref 3.8–10.5)
WBC UR QL: ABNORMAL

## 2017-09-10 PROCEDURE — 71010: CPT | Mod: 26

## 2017-09-10 PROCEDURE — 99285 EMERGENCY DEPT VISIT HI MDM: CPT

## 2017-09-10 PROCEDURE — 93970 EXTREMITY STUDY: CPT | Mod: 26

## 2017-09-10 RX ORDER — OMEPRAZOLE 10 MG/1
1 CAPSULE, DELAYED RELEASE ORAL
Qty: 0 | Refills: 0 | COMMUNITY

## 2017-09-10 RX ORDER — IPRATROPIUM/ALBUTEROL SULFATE 18-103MCG
3 AEROSOL WITH ADAPTER (GRAM) INHALATION ONCE
Qty: 0 | Refills: 0 | Status: COMPLETED | OUTPATIENT
Start: 2017-09-10 | End: 2017-09-10

## 2017-09-10 RX ORDER — ACETAMINOPHEN 500 MG
650 TABLET ORAL EVERY 6 HOURS
Qty: 0 | Refills: 0 | Status: DISCONTINUED | OUTPATIENT
Start: 2017-09-10 | End: 2017-09-13

## 2017-09-10 RX ORDER — IPRATROPIUM/ALBUTEROL SULFATE 18-103MCG
3 AEROSOL WITH ADAPTER (GRAM) INHALATION EVERY 6 HOURS
Qty: 0 | Refills: 0 | Status: DISCONTINUED | OUTPATIENT
Start: 2017-09-10 | End: 2017-09-13

## 2017-09-10 RX ORDER — AZITHROMYCIN 500 MG/1
TABLET, FILM COATED ORAL
Qty: 0 | Refills: 0 | Status: DISCONTINUED | OUTPATIENT
Start: 2017-09-10 | End: 2017-09-13

## 2017-09-10 RX ORDER — ENOXAPARIN SODIUM 100 MG/ML
40 INJECTION SUBCUTANEOUS DAILY
Qty: 0 | Refills: 0 | Status: DISCONTINUED | OUTPATIENT
Start: 2017-09-10 | End: 2017-09-13

## 2017-09-10 RX ORDER — BUDESONIDE, MICRONIZED 100 %
0.5 POWDER (GRAM) MISCELLANEOUS
Qty: 0 | Refills: 0 | Status: DISCONTINUED | OUTPATIENT
Start: 2017-09-10 | End: 2017-09-13

## 2017-09-10 RX ORDER — AZITHROMYCIN 500 MG/1
500 TABLET, FILM COATED ORAL EVERY 24 HOURS
Qty: 0 | Refills: 0 | Status: DISCONTINUED | OUTPATIENT
Start: 2017-09-11 | End: 2017-09-13

## 2017-09-10 RX ORDER — SODIUM CHLORIDE 9 MG/ML
1000 INJECTION INTRAMUSCULAR; INTRAVENOUS; SUBCUTANEOUS
Qty: 0 | Refills: 0 | Status: COMPLETED | OUTPATIENT
Start: 2017-09-10 | End: 2017-09-10

## 2017-09-10 RX ORDER — ARFORMOTEROL TARTRATE 15 UG/2ML
2 SOLUTION RESPIRATORY (INHALATION)
Qty: 0 | Refills: 0 | COMMUNITY

## 2017-09-10 RX ORDER — INFLUENZA VIRUS VACCINE 15; 15; 15; 15 UG/.5ML; UG/.5ML; UG/.5ML; UG/.5ML
0.5 SUSPENSION INTRAMUSCULAR ONCE
Qty: 0 | Refills: 0 | Status: COMPLETED | OUTPATIENT
Start: 2017-09-10 | End: 2017-09-10

## 2017-09-10 RX ORDER — AZITHROMYCIN 500 MG/1
500 TABLET, FILM COATED ORAL ONCE
Qty: 0 | Refills: 0 | Status: COMPLETED | OUTPATIENT
Start: 2017-09-10 | End: 2017-09-10

## 2017-09-10 RX ORDER — HYDROCORTISONE 20 MG
20 TABLET ORAL DAILY
Qty: 0 | Refills: 0 | Status: DISCONTINUED | OUTPATIENT
Start: 2017-09-10 | End: 2017-09-10

## 2017-09-10 RX ADMIN — SODIUM CHLORIDE 1000 MILLILITER(S): 9 INJECTION INTRAMUSCULAR; INTRAVENOUS; SUBCUTANEOUS at 09:30

## 2017-09-10 RX ADMIN — Medication 3 MILLILITER(S): at 08:49

## 2017-09-10 RX ADMIN — Medication 0.5 MILLIGRAM(S): at 18:34

## 2017-09-10 RX ADMIN — SODIUM CHLORIDE 1000 MILLILITER(S): 9 INJECTION INTRAMUSCULAR; INTRAVENOUS; SUBCUTANEOUS at 08:30

## 2017-09-10 RX ADMIN — Medication 3 MILLILITER(S): at 12:19

## 2017-09-10 RX ADMIN — Medication 125 MILLIGRAM(S): at 08:30

## 2017-09-10 RX ADMIN — Medication 3 MILLILITER(S): at 18:34

## 2017-09-10 RX ADMIN — AZITHROMYCIN 255 MILLIGRAM(S): 500 TABLET, FILM COATED ORAL at 13:47

## 2017-09-10 NOTE — CONSULT NOTE ADULT - SUBJECTIVE AND OBJECTIVE BOX
Chart reviewed: Assessment plan is as below Note will be updated as more  patient data is gathered Chart reviewed: Assessment plan is as below Note will be updated as more  patient data is gathered     Patient                                          RITA BLANCAS [facility] [id number] 11/4/1929   ALLERGY Rocephin Sulfa tetracycline  CONTACT Dtr Sandy Edmonds [phone] self [phone]  REASON FOR VISIT   Initial evaluation/Pulmonary consultation requested  9/10/2017 by Dr Castelan from Dr Hansen  REASON FOR VISIT   Initial evaluation/Pulmonary consultation requested    Patient examined chart reviewed  HOSPITAL ADMISSION [facility] Dr Castelan 9/10/2017   PATIENT CAME  FROM (if information available)    PAST MEDICAL & SURGICAL HISTORY:  Past Medical History:  COPD (chronic obstructive pulmonary disease)    Gallstone.  Herniation of cervical disc   Syncope   Family History:  No pertinent family history in first degree relatives.  VITALS/LABS  9/10/2017 W 15.8 Hb 14.2 Plt 393  Na 138 K 5.6 CO2 30 Cr .8  PATIENT DESCRIPTION HOSPITAL COURSE PLAN 9/10 ADMISSION [facility]   88 yo female nonsmoker HO passive smoking () has home nebulizer Uses brovana No HO MI Baseline does not drive (out of choice) had been sick x 2 w pta Rxed with zpak then levaquin without relief with yellow phlegm No fever no chest pain no hemoptsyis   admitted [facility] 8/29-8/31/2017 with poss pneumonia COPD ex PNEUMONIA 8/29/2017 CXR R perihilar pneumonia  Rxed Azithro (8/29) aztreonam (8/29) Pct was .08 (8/30) BC neg 8/30  Abio changed to doxy by Dr Vasquez 8/30)   PROMINENT R HILUM  8/29 CT ch showed mainly chr looking changes 8/29/2017 CT Ch 1) Bectasis rul 2) Cyst Bectasis rml which appears collapsed 3) tib rll 4) calcified granuloma rul 5) Mini effsn or pl thickening r base 6) pleuroparenchymal scarring apices r greater   ECHO 8/30 N LVSF RVSP 35 COPD  duoneb.4 (8/29) Steroids added RO VTE  V duplex  8/29 n   Patient presented to [facility] ER 9/10 with weakness SOB nonproductive cough BARRIOS admitted [facility] 9/10 with COPD EX Resp distress  ER VITALS 9/10/2017 7a afeb 106 120/70 90%   ER MEDS GIVEN BEFORE 9/10 11 A Solumed 125 NS 1l bolus Duoneb   DC MEDS 8/31/2017 Prednisone Taper Pulmicort doxycycline prilosec   PROBLEM ASSESSMENT PLAN   RESP   9/10 RA 90%   9/10 3l 738/48/84   LEUKOCYTOSIS RESP TRACT INFECTION  9/10 W 15.8   9/10 Leukocytosis may be 2/2 recent steroid taper   COPD EX  BD Steroids   RO VTE   9/10 V duplex   9/10 Will consider CTA Ch  HYPERKALEMIA   9/10 K 5.6 CR .8   Monitor serially Repeat   GLOBAL ISSUE/BEST PRACTICE:        PROBLEM:      Analgesia:     na                        PROBLEM: Sedation:     na               PROBLEM: HOB elevation:   y             PROBLEM: Stress ulcer proph:    na                      PROBLEM: VTE prophylaxis:      lvnx 40 (9/10)                   PROBLEM: Glycemic control:    na   PROBLEM: Nutrition:    reg (9/10)           PROBLEM: Advanced directive: na     PROBLEM: Allergies:  na  TIME SPENT Over 55 minutes aggregate care time spent on encounter; activities included   direct patient care, counseling and/or coordinating care reviewing notes, lab data/ imaging , discussion with multidisciplinary team/ patient  /family. Risks, benefits, alternatives  discussed in detail. Questions/concerns  were addressed  to the best of my ability . Chart reviewed: Assessment plan is as below Note will be updated as more  patient data is gathered     Patient seen and examined today Plan discussed/Questions answered  (with patient/ancillary staff/colleagues) Chart notes reviewd More detailed Pulm/Critical Care notes, assessment and plan  will be added later today as needed after reviewing further labs as they become available     Notable interval events reviewed    ROS/PE  .   REVIEW OF SYMPTOMS    Able to give ROS  Yes     RELIABLE NO   Weakness Yes   Chills No   Vision changes No  Lymph nodes No enlarged glands   Endocrine No unexplained hair loss No het or cold intolerance   Allergy No hives   Sore throat No   Coughing blood No  Headache No  Confusion YES  Chest pain No  Palpitations No   Pain abdomen NO   Shortness of breath YES  Vomiting NO  Pain neck No  Pain abdomen NO     PHYSICAL EXAM    HEENT Unremarkable PERRLA atraumatic  RESP Fair air entry EXP prolonged    Harsh breath sound Resp distres mild  CARDIAC S1 S2 No S3     NO JVD   Awake Alert and ORIENTED x tw  ABDOMEN SOFT BS PRESENT NOT DISTENDED No hepatosplenomegaly  PEDAL EDEMA present No calf tenderness  NO rash GENERAL Not TOXIC looking  .   Patient                                          RITA BLANCAS [facility] S [id number] 11/4/1929   ALLERGY Rocephin Sulfa tetracycline  CONTACT Dtr Sandy Edmonds [phone] self [phone]  REASON FOR VISIT   Initial evaluation/Pulmonary consultation requested  9/10/2017 by Dr Castelan from Dr Hansen  REASON FOR VISIT   Initial evaluation/Pulmonary consultation requested    Patient examined chart reviewed  HOSPITAL ADMISSION [facility] Dr Castelan 9/10/2017   PATIENT CAME  FROM (if information available)    PAST MEDICAL & SURGICAL HISTORY:  Past Medical History:  COPD (chronic obstructive pulmonary disease)    Gallstone.  Herniation of cervical disc   Syncope   Family History:  No pertinent family history in first degree relatives.  VITALS/LABS  9/10/2017 W 15.8 Hb 14.2 Plt 393  Na 138 K 5.6 CO2 30 Cr .8  PATIENT DESCRIPTION HOSPITAL COURSE PLAN 9/10 ADMISSION [facility] S   88 yo female nonsmoker HO passive smoking () has home nebulizer Uses brovana No HO MI Baseline does not drive (out of choice) had been sick x 2 w pta Rxed with zpak then levaquin without relief with yellow phlegm No fever no chest pain no hemoptsyis   admitted [facility] S 8/29-8/31/2017 with poss pneumonia COPD ex PNEUMONIA 8/29/2017 CXR R perihilar pneumonia  Rxed Azithro (8/29) aztreonam (8/29) Pct was .08 (8/30) BC neg 8/30  Abio changed to doxy by Dr Vasquez 8/30)   PROMINENT R HILUM  8/29 CT ch showed mainly chr looking changes 8/29/2017 CT Ch 1) Bectasis rul 2) Cyst Bectasis rml which appears collapsed 3) tib rll 4) calcified granuloma rul 5) Mini effsn or pl thickening r base 6) pleuroparenchymal scarring apices r greater   ECHO 8/30 N LVSF RVSP 35 COPD  duoneb.4 (8/29) Steroids added RO VTE  V duplex  8/29 n   Patient presented to [facility] ER 9/10 with weakness SOB nonproductive cough BARRIOS admitted [facility] S 9/10 with COPD EX Resp distress  ER VITALS 9/10/2017 7a afeb 106 120/70 90%   ER MEDS GIVEN BEFORE 9/10 11 A Solumed 125 NS 1l bolus Duoneb   DC MEDS 8/31/2017 Prednisone Taper Pulmicort doxycycline prilosec   PROBLEM ASSESSMENT PLAN   RESP   9/10 RA 90%   9/10 3l 738/48/84   LEUKOCYTOSIS RESP TRACT INFECTION  9/10 W 15.8   9/10 Leukocytosis may be 2/2 recent steroid taper   COPD EX  BD Steroids   RO VTE   9/10 V duplex   9/10 Will consider CTA Ch  HYPERKALEMIA   9/10 K 5.6 CR .8   Monitor serially Repeat   GLOBAL ISSUE/BEST PRACTICE:        PROBLEM:      Analgesia:     na                        PROBLEM: Sedation:     na               PROBLEM: HOB elevation:   y             PROBLEM: Stress ulcer proph:    na                      PROBLEM: VTE prophylaxis:      lvnx 40 (9/10)                   PROBLEM: Glycemic control:    na   PROBLEM: Nutrition:    reg (9/10)           PROBLEM: Advanced directive: na     PROBLEM: Allergies:  na  TIME SPENT Over 55 minutes aggregate care time spent on encounter; activities included   direct patient care, counseling and/or coordinating care reviewing notes, lab data/ imaging , discussion with multidisciplinary team/ patient  /family. Risks, benefits, alternatives  discussed in detail. Questions/concerns  were addressed  to the best of my ability .

## 2017-09-10 NOTE — H&P ADULT - NSHPLABSRESULTS_GEN_ALL_CORE
14.2   15.8  )-----------( 393      ( 10 Sep 2017 08:09 )             44.2     10 Sep 2017 08:09    138    |  101    |  22     ----------------------------<  135    5.6     |  30     |  0.84     Ca    9.6        10 Sep 2017 08:09    TPro  8.1    /  Alb  3.2    /  TBili  1.2    /  DBili  x      /  AST  45     /  ALT  23     /  AlkPhos  107    10 Sep 2017 08:09    LIVER FUNCTIONS - ( 10 Sep 2017 08:09 )  Alb: 3.2 g/dL / Pro: 8.1 g/dL / ALK PHOS: 107 U/L / ALT: 23 U/L DA / AST: 45 U/L / GGT: x           PT/INR - ( 10 Sep 2017 08:09 )   PT: 11.8 sec;   INR: 1.08 ratio         PTT - ( 10 Sep 2017 08:09 )  PTT:31.0 sec  CAPILLARY BLOOD GLUCOSE

## 2017-09-10 NOTE — H&P ADULT - HISTORY OF PRESENT ILLNESS
this is a 87y Female brought to ER complaining of shortness of breath.  patient was recently discharged from [facility] after treatment for pneumonia. Patient also has c/o weakness , SOB, and dry cough. Denies fever pain n v or other symptom.  CHEST CONGESTION, COUGH, DIFFICULTY BREATHING, DYSPNEA ON EXERTION, SHORTNESS OF BREATH this is an 88 YO W Female brought to ER complaining of shortness of breath.  Patient was recently discharged from [facility] after treatment for pneumonia. Patient also has c/o weakness , SOB, and dry cough. Denies fever pain nausea/vomiting or other symptom.  CHEST CONGESTION, COUGH, DIFFICULTY BREATHING, DYSPNEA ON EXERTION, SHORTNESS OF BREATH.

## 2017-09-10 NOTE — H&P ADULT - RS GEN PE MLT RESP DETAILS PC
breath sounds equal/diminished breath sounds, L/diminished breath sounds, R/airway patent/no subcutaneous emphysema/no rhonchi/no rales/no wheezes

## 2017-09-10 NOTE — H&P ADULT - PMH
Bronchiectasis    Cardiomyopathy    COPD (Chronic Obstructive Pulmonary Disease)    Gallstone    Herniation of Cervical Intervertebral Disc    Pneumonia    Syncope

## 2017-09-10 NOTE — ED PROVIDER NOTE - OBJECTIVE STATEMENT
Recently dced from hospital after tx for pneumonia brought today for eval of weakness , SOB, and dry cough. Denies fever pain n v or other symptom.

## 2017-09-10 NOTE — ED ADULT NURSE NOTE - DISCHARGE DATE/TIME
Breathing spontaneous and unlabored. Breath sounds clear and equal bilaterally with regular rhythm. 12-Sep-2017 07:06

## 2017-09-10 NOTE — ED PROVIDER NOTE - MEDICAL DECISION MAKING DETAILS
88 yo female with COPD recently DCed from hospital now with worsening cough SOB and weakness. May need admission to go to rehab. Plan - Labs CXR [place], pulm consult Dr. Hansen.

## 2017-09-10 NOTE — ED ADULT NURSE NOTE - PMH
Bronchiectasis    Cardiomyopathy    COPD (chronic obstructive pulmonary disease)    COPD (Chronic Obstructive Pulmonary Disease)    Gallstone    Herniation of Cervical Intervertebral Disc    Pneumonia    Syncope

## 2017-09-11 ENCOUNTER — TRANSCRIPTION ENCOUNTER (OUTPATIENT)
Age: 82
End: 2017-09-11

## 2017-09-11 DIAGNOSIS — Z29.9 ENCOUNTER FOR PROPHYLACTIC MEASURES, UNSPECIFIED: ICD-10-CM

## 2017-09-11 LAB
ALBUMIN SERPL ELPH-MCNC: 2.6 G/DL — LOW (ref 3.3–5)
ALP SERPL-CCNC: 108 U/L — SIGNIFICANT CHANGE UP (ref 30–120)
ALT FLD-CCNC: 15 U/L DA — SIGNIFICANT CHANGE UP (ref 10–60)
ANION GAP SERPL CALC-SCNC: 5 MMOL/L — SIGNIFICANT CHANGE UP (ref 5–17)
AST SERPL-CCNC: 17 U/L — SIGNIFICANT CHANGE UP (ref 10–40)
BASOPHILS # BLD AUTO: 0.1 K/UL — SIGNIFICANT CHANGE UP (ref 0–0.2)
BASOPHILS NFR BLD AUTO: 0.5 % — SIGNIFICANT CHANGE UP (ref 0–2)
BILIRUB DIRECT SERPL-MCNC: 0 MG/DL — SIGNIFICANT CHANGE UP (ref 0–0.2)
BILIRUB INDIRECT FLD-MCNC: 0.2 MG/DL — SIGNIFICANT CHANGE UP (ref 0.2–1)
BILIRUB SERPL-MCNC: 0.2 MG/DL — SIGNIFICANT CHANGE UP (ref 0.2–1.2)
BUN SERPL-MCNC: 29 MG/DL — HIGH (ref 7–23)
CALCIUM SERPL-MCNC: 9 MG/DL — SIGNIFICANT CHANGE UP (ref 8.4–10.5)
CHLORIDE SERPL-SCNC: 107 MMOL/L — SIGNIFICANT CHANGE UP (ref 96–108)
CHOLEST SERPL-MCNC: 187 MG/DL — SIGNIFICANT CHANGE UP (ref 10–199)
CO2 SERPL-SCNC: 28 MMOL/L — SIGNIFICANT CHANGE UP (ref 22–31)
CREAT SERPL-MCNC: 0.86 MG/DL — SIGNIFICANT CHANGE UP (ref 0.5–1.3)
CULTURE RESULTS: SIGNIFICANT CHANGE UP
EOSINOPHIL # BLD AUTO: 0 K/UL — SIGNIFICANT CHANGE UP (ref 0–0.5)
EOSINOPHIL NFR BLD AUTO: 0.1 % — SIGNIFICANT CHANGE UP (ref 0–6)
GLUCOSE SERPL-MCNC: 87 MG/DL — SIGNIFICANT CHANGE UP (ref 70–99)
HCT VFR BLD CALC: 35.1 % — SIGNIFICANT CHANGE UP (ref 34.5–45)
HDLC SERPL-MCNC: 85 MG/DL — SIGNIFICANT CHANGE UP (ref 40–125)
HGB BLD-MCNC: 11.2 G/DL — LOW (ref 11.5–15.5)
INR BLD: 0.98 RATIO — SIGNIFICANT CHANGE UP (ref 0.88–1.16)
LIPID PNL WITH DIRECT LDL SERPL: 83 MG/DL — SIGNIFICANT CHANGE UP
LYMPHOCYTES # BLD AUTO: 1 K/UL — SIGNIFICANT CHANGE UP (ref 1–3.3)
LYMPHOCYTES # BLD AUTO: 7 % — LOW (ref 13–44)
MAGNESIUM SERPL-MCNC: 2.1 MG/DL — SIGNIFICANT CHANGE UP (ref 1.6–2.6)
MCHC RBC-ENTMCNC: 31.3 PG — SIGNIFICANT CHANGE UP (ref 27–34)
MCHC RBC-ENTMCNC: 32 GM/DL — SIGNIFICANT CHANGE UP (ref 32–36)
MCV RBC AUTO: 97.9 FL — SIGNIFICANT CHANGE UP (ref 80–100)
MONOCYTES # BLD AUTO: 1.4 K/UL — HIGH (ref 0–0.9)
MONOCYTES NFR BLD AUTO: 9.8 % — SIGNIFICANT CHANGE UP (ref 2–14)
NEUTROPHILS # BLD AUTO: 11.6 K/UL — HIGH (ref 1.8–7.4)
NEUTROPHILS NFR BLD AUTO: 82.6 % — HIGH (ref 43–77)
PHOSPHATE SERPL-MCNC: 3.9 MG/DL — SIGNIFICANT CHANGE UP (ref 2.5–4.5)
PLATELET # BLD AUTO: 319 K/UL — SIGNIFICANT CHANGE UP (ref 150–400)
POTASSIUM SERPL-MCNC: 4.5 MMOL/L — SIGNIFICANT CHANGE UP (ref 3.5–5.3)
POTASSIUM SERPL-SCNC: 4.5 MMOL/L — SIGNIFICANT CHANGE UP (ref 3.5–5.3)
PROT SERPL-MCNC: 6.7 G/DL — SIGNIFICANT CHANGE UP (ref 6–8.3)
PROTHROM AB SERPL-ACNC: 10.7 SEC — SIGNIFICANT CHANGE UP (ref 9.8–12.7)
RBC # BLD: 3.59 M/UL — LOW (ref 3.8–5.2)
RBC # FLD: 13.5 % — SIGNIFICANT CHANGE UP (ref 10.3–14.5)
SODIUM SERPL-SCNC: 140 MMOL/L — SIGNIFICANT CHANGE UP (ref 135–145)
SPECIMEN SOURCE: SIGNIFICANT CHANGE UP
TOTAL CHOLESTEROL/HDL RATIO MEASUREMENT: 2.2 RATIO — LOW (ref 3.3–7.1)
TRIGL SERPL-MCNC: 93 MG/DL — SIGNIFICANT CHANGE UP (ref 10–149)
VIT B12 SERPL-MCNC: 678 PG/ML — SIGNIFICANT CHANGE UP (ref 243–894)
WBC # BLD: 14.1 K/UL — HIGH (ref 3.8–10.5)
WBC # FLD AUTO: 14.1 K/UL — HIGH (ref 3.8–10.5)

## 2017-09-11 RX ADMIN — Medication 3 MILLILITER(S): at 14:12

## 2017-09-11 RX ADMIN — Medication 0.5 MILLIGRAM(S): at 07:44

## 2017-09-11 RX ADMIN — Medication 3 MILLILITER(S): at 07:44

## 2017-09-11 RX ADMIN — Medication 3 MILLILITER(S): at 20:09

## 2017-09-11 RX ADMIN — AZITHROMYCIN 255 MILLIGRAM(S): 500 TABLET, FILM COATED ORAL at 12:19

## 2017-09-11 RX ADMIN — Medication 20 MILLIGRAM(S): at 06:16

## 2017-09-11 RX ADMIN — Medication 0.5 MILLIGRAM(S): at 20:09

## 2017-09-11 RX ADMIN — ENOXAPARIN SODIUM 40 MILLIGRAM(S): 100 INJECTION SUBCUTANEOUS at 12:18

## 2017-09-11 NOTE — DISCHARGE NOTE ADULT - CARE PLAN
Principal Discharge DX:	COPD (chronic obstructive pulmonary disease)  Goal:	resolution of cough  Instructions for follow-up, activity and diet:	Continue nebulised bronchodilators, tapering schedule of steroids, complete oral antibiotics, antitussives prn, followup with PMD IN 1-2 weeks  Secondary Diagnosis:	Bronchiectasis with acute lower respiratory infection  Instructions for follow-up, activity and diet:	continue current managmnet and medications ,followup with Dr Burgos in 1 week

## 2017-09-11 NOTE — PHYSICAL THERAPY INITIAL EVALUATION ADULT - GAIT TRAINING, PT EVAL
Ambulate 150 feet with RW FWB bilat  LE with supervision  in 2-3 days . Negotiate 10 steps with handrail and cane with supervision in 2-3 days.

## 2017-09-11 NOTE — PROGRESS NOTE ADULT - SUBJECTIVE AND OBJECTIVE BOX
Chart reviewed: Assessment plan is as below Note will be updated as more  patient data is gathered  Patient seen and examined today Plan discussed/Questions answered  (with patient/ancillary staff/colleagues) Chart notes reviewd More detailed Pulm/Critical Care notes, assessment and plan  will be added later today as needed after reviewing further labs as they become available     Notable interval events reviewed    ROS/PE  . REVIEW OF SYMPTOMS    Able to give ROS  Yes     RELIABLE NO   Weakness Yes   Chills No   Vision changes No  Lymph nodes No enlarged glands   Endocrine No unexplained hair loss No het or cold intolerance   Allergy No hives   Sore throat No   Coughing blood No  Headache No  Confusion YES  Chest pain No  Palpitations No   Pain abdomen NO   Shortness of breath YES  Vomiting NO  Pain neck No  Pain abdomen NO     PHYSICAL EXAM    HEENT Unremarkable PERRLA atraumatic  RESP Fair air entry EXP prolonged    Harsh breath sound Resp distres mild  CARDIAC S1 S2 No S3     NO JVD   Awake Alert and ORIENTED x tw   ABDOMEN SOFT BS PRESENT NOT DISTENDED No hepatosplenomegaly  PEDAL EDEMA present No calf tenderness  NO rash GENERAL Not TOXIC looking  . Chart reviewed: Assessment plan is as below Note will be updated as more  patient data is gathered  Patient seen and examined today Plan discussed/Questions answered  (with patient/ancillary staff/colleagues) Chart notes reviewd More detailed Pulm/Critical Care notes, assessment and plan  will be added later today as needed after reviewing further labs as they become available     Notable interval events reviewed    ROS/PE  . REVIEW OF SYMPTOMS    Able to give ROS  Yes     RELIABLE NO   Weakness Yes   Chills No   Vision changes No  Lymph nodes No enlarged glands   Endocrine No unexplained hair loss No het or cold intolerance   Allergy No hives   Sore throat No   Coughing blood No  Headache No  Confusion YES  Chest pain No  Palpitations No   Pain abdomen NO   Shortness of breath YES  Vomiting NO  Pain neck No  Pain abdomen NO     PHYSICAL EXAM    HEENT Unremarkable PERRLA atraumatic  RESP Fair air entry EXP prolonged    Harsh breath sound Resp distres mild  CARDIAC S1 S2 No S3     NO JVD   Awake Alert and ORIENTED x tw   ABDOMEN SOFT BS PRESENT NOT DISTENDED No hepatosplenomegaly  PEDAL EDEMA present No calf tenderness  NO rash GENERAL Not TOXIC looking  . VITALS/LABS  9/11/2017 afeb 95 110/44 26 93%  9/11/2017 W 14.1 Hb 11.2 Plt 319 INR .98 Na 140 K 4.5 CO2 28 Cr .8   9/10/2017 W 15.8 Hb 14.2 Plt 393  Na 138 K 5.6 CO2 30 Cr .8  PATIENT DESCRIPTION HOSPITAL COURSE PLAN 9/10 ADMISSION [facility] S   86 yo female nonsmoker HO passive smoking () has home nebulizer Uses brovana No HO MI Baseline does not drive (out of choice) had been sick x 2 w pta 8/29/2017 Rxed with zpak then levaquin without relief with yellow phlegm No fever no chest pain no hemoptsyis   admitted [facility] S 8/29-8/31/2017 with poss pneumonia COPD ex PNEUMONIA 8/29/2017 CXR R perihilar pneumonia  Rxed Azithro (8/29) aztreonam (8/29) Pct was .08 (8/30) BC neg 8/30  Abio changed to doxy by Dr Vasquez 8/30)   PROMINENT R HILUM  8/29 CT ch showed mainly chr looking changes 8/29/2017 CT Ch 1) Bectasis rul 2) Cyst Bectasis rml which appears collapsed 3) tib rll 4) calcified granuloma rul 5) Mini effsn or pl thickening r base 6) pleuroparenchymal scarring apices r greater   ECHO 8/30 N LVSF RVSP 35 COPD  duoneb.4 (8/29) Steroids added RO VTE  V duplex  8/29 n   Patient presented to [facility] ER 9/10 with weakness SOB nonproductive cough BARRIOS admitted [facility] 9/10 with COPD EX Resp distress  ER VITALS 9/10/2017 7a afeb 106 120/70 90%   ER MEDS GIVEN BEFORE 9/10 11 A Solumed 125 NS 1l bolus Duoneb   DC MEDS 8/31/2017 Prednisone Taper Pulmicort doxycycline prilosec   PROBLEM ASSESSMENT PLAN   RESP   9/10 RA 90% 9/11 RA 93%   9/10 3l 738/48/84   LEUKOCYTOSIS RESP TRACT INFECTION POSSIBLE UTI  Azithro (9/10)   9/10-9/11 W 15.8-14.1  9/11 pct n   9/10 Influenza rapid screen n    9/10 UA 1025 Lge bld Over 50 R Mod bact W 11-25   9/10 Leukocytosis may be 2/2 recent steroid taper   COPD EX  BD Steroids Solumed 20 (9/10)   RO VTE   9/10 V duplex n   HYPERKALEMIA RESOLVED  9/10-9/11 K 5.6-4.5 CR .8   Monitor serially Repeat   GLOBAL ISSUE/BEST PRACTICE:        PROBLEM:      Analgesia:     na                        PROBLEM: Sedation:     na               PROBLEM: HOB elevation:   y             PROBLEM: Stress ulcer proph:    na                      PROBLEM: VTE prophylaxis:      lvnx 40 (9/10) (Pt refusing 9/10)                   PROBLEM: Glycemic control:    na   PROBLEM: Nutrition:    moore  (9/10)           PROBLEM: Advanced directive: na     PROBLEM: Allergies:  na  TIME SPENT Over 25 minutes aggregate care time spent on encounter; activities included   direct patient care, counseling and/or coordinating care reviewing notes, lab data/ imaging , discussion with multidisciplinary team/ patient  /family. Risks, benefits, alternatives  discussed in detail. Questions/concerns  were addressed  to the best of my ability .

## 2017-09-11 NOTE — PHYSICAL THERAPY INITIAL EVALUATION ADULT - IMPAIRMENTS FOUND, PT EVAL
muscle strength/gait, locomotion, and balance/aerobic capacity/endurance/ventilation and respiration/gas exchange

## 2017-09-11 NOTE — DISCHARGE NOTE ADULT - PLAN OF CARE
resolution of cough Continue nebulised bronchodilators, tapering schedule of steroids, complete oral antibiotics, antitussives prn, followup with PMD IN 1-2 weeks continue current managmnet and medications ,followup with Dr Burgos in 1 week

## 2017-09-11 NOTE — DISCHARGE NOTE ADULT - COMMUNITY RESOURCES
Nurse to visit the day after hospital discharge; physical therapist to follow. Please contact the home care agency at the above phone number if you have not heard from them by 12 noon on the day after your hospital discharge. [facility]

## 2017-09-11 NOTE — DISCHARGE NOTE ADULT - CARE PROVIDER_API CALL
Dirk Burgos), Internal Medicine; [place] [address]  Phone: [phone]  Fax: [phone] Dirk Burgos), Internal Medicine; [place] [address]  Phone: [phone]  Fax: [phone]    Willian Szymanski), [place] [address]  Phone: [phone]  Fax: [phone]

## 2017-09-11 NOTE — PHYSICAL THERAPY INITIAL EVALUATION ADULT - ADDITIONAL COMMENTS
Pt lives in split level type house,6 steps to enter with handrail.5+5 steps inside with handrail. Son stays with pt, daughter also can help. Has a cane. Usually ambulates without AD.

## 2017-09-11 NOTE — DISCHARGE NOTE ADULT - MEDICATION SUMMARY - MEDICATIONS TO TAKE
I will START or STAY ON the medications listed below when I get home from the hospital:    predniSONE 10 mg oral tablet  -- 3 tab(s) by mouth once a day x 3 days  2 tab(s) by mouth once a day x 3 days  1 tab(s) by mouth once a day x 3 days  -- It is very important that you take or use this exactly as directed.  Do not skip doses or discontinue unless directed by your doctor.  Obtain medical advice before taking any non-prescription drugs as some may affect the action of this medication.  Take with food or milk.    -- Indication: For Chronic obstructive pulmonary disease    budesonide 0.5 mg/2 mL inhalation suspension  -- 1 puff(s) inhaled 2 times a day    -J 44.9  -- Indication: For Chronic obstructive pulmonary disease    acetaminophen 325 mg oral tablet  -- 2 tab(s) by mouth every 6 hours, As needed, Moderate Pain (4 - 6)  -- Indication: For Pain    acetaminophen 325 mg oral tablet  -- 2 tab(s) by mouth every 6 hours, As needed, For Temp greater than 38 C (100.4 F)  -- Indication: For fever    albuterol 0.63 mg/3 mL (0.021%) inhalation solution  --  inhaled 3 times a day  -- Indication: For Chronic obstructive pulmonary disease    Brovana 15 mcg/2 mL inhalation solution  -- 2 milliliter(s) inhaled 2 times a day  -- Indication: For Chronic obstructive pulmonary disease    guaiFENesin 100 mg/5 mL oral liquid  -- 5 milliliter(s) by mouth every 6 hours, As Needed OTC  -- Indication: For Chronic obstructive pulmonary disease    Zithromax TRI-SLOANE 500 mg oral tablet  -- 1 tab(s) by mouth once a day   -- Do not take dairy products, antacids, or iron preparations within one hour of this medication.  Finish all this medication unless otherwise directed by prescriber.    -- Indication: For Chronic obstructive pulmonary disease

## 2017-09-11 NOTE — PROGRESS NOTE ADULT - PROBLEM SELECTOR PLAN 3
continue current managmnet and medications continue current managmnet and medications had echo last admission -found to have normal LV systolic function and no valve disease

## 2017-09-11 NOTE — DISCHARGE NOTE ADULT - HOSPITAL COURSE
9/11/2017 afeb 95 110/44 26 93%  9/11/2017 W 14.1 Hb 11.2 Plt 319 INR .98 Na 140 K 4.5 CO2 28 Cr .8   9/10/2017 W 15.8 Hb 14.2 Plt 393  Na 138 K 5.6 CO2 30 Cr .8  PATIENT DESCRIPTION HOSPITAL COURSE PLAN 9/10 ADMISSION [facility]   86 yo female nonsmoker HO passive smoking () has home nebulizer Uses brovana No HO MI Baseline does not drive (out of choice) had been sick x 2 w pta 8/29/2017 Rxed with zpak then levaquin without relief with yellow phlegm No fever no chest pain no hemoptsyis   admitted [facility] 8/29-8/31/2017 with poss pneumonia COPD ex PNEUMONIA 8/29/2017 CXR R perihilar pneumonia  Rxed Azithro (8/29) aztreonam (8/29) Pct was .08 (8/30) BC neg 8/30  Abio changed to doxy by Dr Vasquez 8/30)   PROMINENT R HILUM  8/29 CT ch showed mainly chr looking changes 8/29/2017 CT Ch 1) Bectasis rul 2) Cyst Bectasis rml which appears collapsed 3) tib rll 4) calcified granuloma rul 5) Mini effsn or pl thickening r base 6) pleuroparenchymal scarring apices r greater   ECHO 8/30 N LVSF RVSP 35 COPD  duoneb.4 (8/29) Steroids added RO VTE  V duplex  8/29 n   Patient presented to [facility] ER 9/10 with weakness SOB nonproductive cough BARRIOS admitted [facility] 9/10 with COPD EX Resp distress  ER VITALS 9/10/2017 7a afeb 106 120/70 90%   ER MEDS GIVEN BEFORE 9/10 11 A Solumed 125 NS 1l bolus Duoneb   DC MEDS 8/31/2017 Prednisone Taper Pulmicort doxycycline prilosec   PROBLEM ASSESSMENT PLAN   RESP   9/10 RA 90% 9/11 RA 93%   9/10 3l 738/48/84   LEUKOCYTOSIS RESP TRACT INFECTION POSSIBLE UTI  Azithro (9/10)   9/10-9/11 W 15.8-14.1  9/11 pct n   9/10 Influenza rapid screen n    9/10 UA 1025 Lge bld Over 50 R Mod bact W 11-25   9/10 Leukocytosis may be 2/2 recent steroid taper   COPD EX  BD Steroids Solumed 20 (9/10)   RO VTE   9/10 V duplex n   HYPERKALEMIA RESOLVED  9/10-9/11 K 5.6-4.5 CR .8   Monitor serially Repeat

## 2017-09-11 NOTE — GOALS OF CARE CONVERSATION - PERSONAL ADVANCE DIRECTIVE - CONVERSATION DETAILS
spoke to pt regarding resuscitation .She states she does not want to make any decisions at this time but she will have discussions with her children regarding her wishes

## 2017-09-12 LAB
ANION GAP SERPL CALC-SCNC: 3 MMOL/L — LOW (ref 5–17)
BUN SERPL-MCNC: 36 MG/DL — HIGH (ref 7–23)
CALCIUM SERPL-MCNC: 8.9 MG/DL — SIGNIFICANT CHANGE UP (ref 8.4–10.5)
CHLORIDE SERPL-SCNC: 105 MMOL/L — SIGNIFICANT CHANGE UP (ref 96–108)
CO2 SERPL-SCNC: 33 MMOL/L — HIGH (ref 22–31)
CREAT SERPL-MCNC: 0.72 MG/DL — SIGNIFICANT CHANGE UP (ref 0.5–1.3)
GLUCOSE SERPL-MCNC: 85 MG/DL — SIGNIFICANT CHANGE UP (ref 70–99)
HCT VFR BLD CALC: 34.4 % — LOW (ref 34.5–45)
HGB BLD-MCNC: 11.2 G/DL — LOW (ref 11.5–15.5)
MCHC RBC-ENTMCNC: 31.4 PG — SIGNIFICANT CHANGE UP (ref 27–34)
MCHC RBC-ENTMCNC: 32.7 GM/DL — SIGNIFICANT CHANGE UP (ref 32–36)
MCV RBC AUTO: 96.1 FL — SIGNIFICANT CHANGE UP (ref 80–100)
PLATELET # BLD AUTO: 333 K/UL — SIGNIFICANT CHANGE UP (ref 150–400)
POTASSIUM SERPL-MCNC: 4.5 MMOL/L — SIGNIFICANT CHANGE UP (ref 3.5–5.3)
POTASSIUM SERPL-SCNC: 4.5 MMOL/L — SIGNIFICANT CHANGE UP (ref 3.5–5.3)
RBC # BLD: 3.58 M/UL — LOW (ref 3.8–5.2)
RBC # FLD: 12.9 % — SIGNIFICANT CHANGE UP (ref 10.3–14.5)
SODIUM SERPL-SCNC: 141 MMOL/L — SIGNIFICANT CHANGE UP (ref 135–145)
WBC # BLD: 11.1 K/UL — HIGH (ref 3.8–10.5)
WBC # FLD AUTO: 11.1 K/UL — HIGH (ref 3.8–10.5)

## 2017-09-12 RX ORDER — ACETAMINOPHEN 500 MG
2 TABLET ORAL
Qty: 0 | Refills: 0 | COMMUNITY
Start: 2017-09-12

## 2017-09-12 RX ORDER — AZITHROMYCIN 500 MG/1
1 TABLET, FILM COATED ORAL
Qty: 3 | Refills: 0 | OUTPATIENT
Start: 2017-09-12 | End: 2017-09-15

## 2017-09-12 RX ADMIN — AZITHROMYCIN 255 MILLIGRAM(S): 500 TABLET, FILM COATED ORAL at 12:16

## 2017-09-12 RX ADMIN — Medication 3 MILLILITER(S): at 12:33

## 2017-09-12 RX ADMIN — Medication 0.5 MILLIGRAM(S): at 08:05

## 2017-09-12 RX ADMIN — Medication 3 MILLILITER(S): at 19:43

## 2017-09-12 RX ADMIN — ENOXAPARIN SODIUM 40 MILLIGRAM(S): 100 INJECTION SUBCUTANEOUS at 12:17

## 2017-09-12 RX ADMIN — Medication 0.5 MILLIGRAM(S): at 19:43

## 2017-09-12 RX ADMIN — Medication 20 MILLIGRAM(S): at 05:19

## 2017-09-12 RX ADMIN — Medication 3 MILLILITER(S): at 08:05

## 2017-09-12 NOTE — PROGRESS NOTE ADULT - PROBLEM SELECTOR PLAN 3
continue current managmnet and medications had echo last admission -found to have normal LV systolic function and no valve disease

## 2017-09-12 NOTE — PROGRESS NOTE ADULT - SUBJECTIVE AND OBJECTIVE BOX
Patient was seen and examined on a day of discharge . Plan of care , discharge medications and recommendations discussed with consultants and clearance for discharge obtained .Social service , case managment  and medical staff are aware of plan. Family is notified,spoke to the daughter at bedside ,she requests rehab placement in [facility] . Discharge summary  is  prepared electronically PROGRESS NOTE    OVERNIGHT  No new issues reported by medical staff . All above noted   SUBJECTIVE  Patient is resting in a bed comfortably .Ambulated with PT ,O2 SAT 92 % on RA  .No distress noted   PAST MEDICAL & SURGICAL HISTORY:  Bronchiectasis  Gallstone  Pneumonia  Syncope  Herniation of Cervical Intervertebral Disc  Cardiomyopathy  COPD (Chronic Obstructive Pulmonary Disease)  History of Hysterectomy    HEALTH ISSUES - PROBLEM Dx:  Prophylactic measure: Prophylactic measure  Ischemic cardiomyopathy: Ischemic cardiomyopathy  Bronchiectasis with acute lower respiratory infection: Bronchiectasis with acute lower respiratory infection  Chronic obstructive pulmonary disease with acute lower respiratory infection: Chronic obstructive pulmonary disease with acute lower respiratory infection          MEDICATIONS  (STANDING):  ALBUTerol/ipratropium for Nebulization 3 milliLiter(s) Nebulizer every 6 hours  enoxaparin Injectable 40 milliGRAM(s) SubCutaneous daily  azithromycin  IVPB 500 milliGRAM(s) IV Intermittent every 24 hours  azithromycin  IVPB      buDESOnide   0.5 milliGRAM(s) Respule 0.5 milliGRAM(s) Inhalation two times a day  methylPREDNISolone sodium succinate Injectable 20 milliGRAM(s) IV Push daily    MEDICATIONS  (PRN):  guaiFENesin   Syrup  (Sugar-Free) 200 milliGRAM(s) Oral every 6 hours PRN Cough  acetaminophen   Tablet. 650 milliGRAM(s) Oral every 6 hours PRN Moderate Pain (4 - 6)  acetaminophen   Tablet 650 milliGRAM(s) Oral every 6 hours PRN For Temp greater than 38 C (100.4 F)      OBJECTIVE    T(C): 36.7 (17 @ 07:30), Max: 37 (17 @ 12:00)  HR: 78 (17 @ 08:07) (78 - 105)  BP: 99/52 (17 @ 07:30) (99/52 - 119/51)  RR: 16 (17 @ 07:30) (16 - 27)  SpO2: 98% (17 @ 08:07) (95% - 99%)  Wt(kg): --  I&O's Summary        REVIEW OF SYSTEMS:  CONSTITUTIONAL: No fever, weight loss, or fatigue  EYES: No eye pain, visual disturbances, or discharge  ENMT:  No difficulty hearing, tinnitus, vertigo; No sinus or throat pain  NECK: No pain or stiffness  BREASTS: No pain, masses, or nipple discharge  RESPIRATORY: No cough, wheezing, chills or hemoptysis; No shortness of breath  CARDIOVASCULAR: No chest pain, palpitations, dizziness, or leg swelling  GASTROINTESTINAL: No abdominal pain. No nausea, vomiting; No diarrhea or constipation. No melena or hematochezia.  GENITOURINARY: No dysuria, frequency, hematuria, or incontinence  NEUROLOGICAL: No headaches, memory loss, loss of strength, numbness, or tremors  SKIN: No itching, burning, rashes, or lesions   LYMPH NODES: No enlarged glands  MUSCULOSKELETAL: No joint pain or swelling; No muscle, back, or extremity pain  HEME/LYMPH: No easy bruising, or bleeding gums  ALLERY AND IMMUNOLOGIC: No hives or eczema      PHYSICAL EXAM:  Appearance: NAD.   HEENT:   Moist oral mucosa. Conjunctiva clear b/l.   Neck : supple  Cardiovascular: RRR ,S1S2 present  Respiratory: Lungs CTAB.  Gastrointestinal:  Soft, nontender. No rebound. No rigidity. BS present	  Skin: No rashes, No ecchymoses, No cyanosis.	  Neurologic: Non-focal. Moving all extremities. Following commands.  Extremities: No edema. No erythema. No calf tenderness.  	  LABS:                        11.2   11.1  )-----------( 333      ( 12 Sep 2017 05:46 )             34.4     -    141  |  105  |  36<H>  ----------------------------<  85  4.5   |  33<H>  |  0.72    Ca    8.9      12 Sep 2017 05:46  Phos  3.9       Mg     2.1         TPro  6.7  /  Alb  2.6<L>  /  TBili  0.2  /  DBili  0.0  /  AST  17  /  ALT  15  /  AlkPhos  108  -    PT/INR - ( 11 Sep 2017 06:30 )   PT: 10.7 sec;   INR: 0.98 ratio           Urinalysis Basic - ( 10 Sep 2017 14:17 )    Color: Yellow / Appearance: Clear / S.025 / pH: x  Gluc: x / Ketone: Negative  / Bili: Negative / Urobili: 1 mg/dL   Blood: x / Protein: 100 mg/dL / Nitrite: Negative   Leuk Esterase: Small / RBC: >50 /HPF / WBC 11-25   Sq Epi: x / Non Sq Epi: Few / Bacteria: Moderate        RADIOLOGY & ADDITIONAL TESTS:    ASSESSMENT/PLAN:

## 2017-09-12 NOTE — PROGRESS NOTE ADULT - SUBJECTIVE AND OBJECTIVE BOX
Chart reviewed: Assessment plan is as below Note will be updated as more  patient data is gathered     VITALS/LABS  9/12/2017 afeb 89 104/45 17 95%   9/12/2017 W 11.1 Hb 11.2 Plt 333 Na 141 K 4.5 CO2 33   PATIENT DESCRIPTION HOSPITAL COURSE PLAN 9/10 ADMISSION [facility]   88 yo female nonsmoker HO passive smoking () has home nebulizer Uses brovana No HO MI Baseline does not drive (out of choice) had been sick x 2 w pta 8/29/2017 Rxed with zpak then levaquin without relief with yellow phlegm No fever no chest pain no hemoptsyis   admitted [facility] 8/29-8/31/2017 with poss pneumonia COPD ex PNEUMONIA 8/29/2017 CXR R perihilar pneumonia  Rxed Azithro (8/29) aztreonam (8/29) Pct was .08 (8/30) BC neg 8/30  Abio changed to doxy by Dr Vasquez 8/30)   PROMINENT R HILUM  8/29 CT ch showed mainly chr looking changes 8/29/2017 CT Ch 1) Bectasis rul 2) Cyst Bectasis rml which appears collapsed 3) tib rll 4) calcified granuloma rul 5) Mini effsn or pl thickening r base 6) pleuroparenchymal scarring apices r greater   ECHO 8/30 N LVSF RVSP 35 COPD  duoneb.4 (8/29) Steroids added RO VTE  V duplex  8/29 n   Patient presented to [facility] ER 9/10 with weakness SOB nonproductive cough BARRIOS admitted [facility] 9/10 with COPD EX Resp distress  ER VITALS 9/10/2017 7a afeb 106 120/70 90%   ER MEDS GIVEN BEFORE 9/10 11 A Solumed 125 NS 1l bolus Duoneb   DC MEDS 8/31/2017 Prednisone Taper Pulmicort doxycycline prilosec   PROBLEM ASSESSMENT PLAN   RESP   9/10 RA 90% 9/11 RA 93%   9/10 3l 738/48/84   LEUKOCYTOSIS RESP TRACT INFECTION POSSIBLE UTI  Azithro (9/10)   9/10-9/11-9/12  W 15.8-14.1-11.1   9/11 pct n 9/10 bc n   9/10 Influenza rapid screen n  9/11 RVP n   9/10 UA 1025 Lge bld Over 50 R Mod bact W 11-25   9/10 Leukocytosis may be 2/2 recent steroid taper   COPD EX  BD Steroids Solumed 20 (9/10)   RO VTE   9/10 V duplex n   HYPERKALEMIA RESOLVED  9/10-9/11 K 5.6-4.5 CR .8   Monitor serially Repeat   9/12/2017 Pul status improved May need rehab placement   GLOBAL ISSUE/BEST PRACTICE:        PROBLEM:      Analgesia:     na                        PROBLEM: Sedation:     na               PROBLEM: HOB elevation:   y             PROBLEM: Stress ulcer proph:    na                      PROBLEM: VTE prophylaxis:      lvnx 40 (9/10) (Pt refusing 9/10)                   PROBLEM: Glycemic control:    na   PROBLEM: Nutrition:    moore  (9/10)           PROBLEM: Advanced directive: na     PROBLEM: Allergies:  na  TIME SPENT Over 25 minutes aggregate care time spent on encounter; activities included   direct patient care, counseling and/or coordinating care reviewing notes, lab data/ imaging , discussion with multidisciplinary team/ patient  /family. Risks, benefits, alternatives  discussed in detail. Questions/concerns  were addressed  to the best of my ability . Chart reviewed: Assessment plan is as below Note will be updated as more  patient data is gathered   REVIEW OF SYMPTOMS    Able to give ROS  Yes     RELIABLE NO   Weakness Yes   Chills No   Vision changes No  Lymph nodes No enlarged glands   Endocrine No unexplained hair loss No het or cold intolerance   Allergy No hives   Sore throat No   Coughing blood No  Headache No  Confusion YES  Chest pain No  Palpitations No   Pain abdomen NO   Shortness of breath YES  Vomiting NO  Pain neck No  Pain abdomen NO     PHYSICAL EXAM    HEENT Unremarkable PERRLA atraumatic  RESP Fair air entry EXP prolonged    Harsh breath sound Resp distres mild  CARDIAC S1 S2 No S3     NO JVD   Awake Alert and ORIENTED x tw  ABDOMEN SOFT BS PRESENT NOT DISTENDED No hepatosplenomegaly  PEDAL EDEMA present No calf tenderness  NO rash GENERAL Not TOXIC looking  .   VITALS/LABS  9/12/2017 afeb 89 104/45 17 95%   9/12/2017 W 11.1 Hb 11.2 Plt 333 Na 141 K 4.5 CO2 33   PATIENT DESCRIPTION HOSPITAL COURSE PLAN 9/10 ADMISSION [facility]   86 yo female nonsmoker HO passive smoking () has home nebulizer Uses brovana No HO MI Baseline does not drive (out of choice) had been sick x 2 w pta 8/29/2017 Rxed with zpak then levaquin without relief with yellow phlegm No fever no chest pain no hemoptsyis   admitted [facility] 8/29-8/31/2017 with poss pneumonia COPD ex PNEUMONIA 8/29/2017 CXR R perihilar pneumonia  Rxed Azithro (8/29) aztreonam (8/29) Pct was .08 (8/30) BC neg 8/30  Abio changed to doxy by Dr Vasquez 8/30)   PROMINENT R HILUM  8/29 CT ch showed mainly chr looking changes 8/29/2017 CT Ch 1) Bectasis rul 2) Cyst Bectasis rml which appears collapsed 3) tib rll 4) calcified granuloma rul 5) Mini effsn or pl thickening r base 6) pleuroparenchymal scarring apices r greater   ECHO 8/30 N LVSF RVSP 35 COPD  duoneb.4 (8/29) Steroids added RO VTE  V duplex  8/29 n   Patient presented to [facility] ER 9/10 with weakness SOB nonproductive cough BARRIOS admitted [facility] 9/10 with COPD EX Resp distress  ER VITALS 9/10/2017 7a afeb 106 120/70 90%   ER MEDS GIVEN BEFORE 9/10 11 A Solumed 125 NS 1l bolus Duoneb   DC MEDS 8/31/2017 Prednisone Taper Pulmicort doxycycline prilosec   PROBLEM ASSESSMENT PLAN   RESP   9/10 RA 90% 9/11 RA 93%   9/10 3l 738/48/84   LEUKOCYTOSIS RESP TRACT INFECTION POSSIBLE UTI  Azithro (9/10)   9/10-9/11-9/12  W 15.8-14.1-11.1   9/11 pct n 9/10 bc n   9/10 Influenza rapid screen n  9/11 RVP n   9/10 UA 1025 Lge bld Over 50 R Mod bact W 11-25   9/10 Leukocytosis may be 2/2 recent steroid taper   COPD EX  BD Steroids Solumed 20 (9/10)   RO VTE   9/10 V duplex n   HYPERKALEMIA RESOLVED  9/10-9/11 K 5.6-4.5 CR .8   Monitor serially Repeat   9/12/2017 Pul status improved May need rehab placement   GLOBAL ISSUE/BEST PRACTICE:        PROBLEM:      Analgesia:     na                        PROBLEM: Sedation:     na               PROBLEM: HOB elevation:   y             PROBLEM: Stress ulcer proph:    na                      PROBLEM: VTE prophylaxis:      lvnx 40 (9/10) (Pt refusing 9/10)                   PROBLEM: Glycemic control:    na   PROBLEM: Nutrition:    moore  (9/10)           PROBLEM: Advanced directive: na     PROBLEM: Allergies:  na  TIME SPENT Over 25 minutes aggregate care time spent on encounter; activities included   direct patient care, counseling and/or coordinating care reviewing notes, lab data/ imaging , discussion with multidisciplinary team/ patient  /family. Risks, benefits, alternatives  discussed in detail. Questions/concerns  were addressed  to the best of my ability .

## 2017-09-13 VITALS
RESPIRATION RATE: 18 BRPM | TEMPERATURE: 99 F | SYSTOLIC BLOOD PRESSURE: 111 MMHG | DIASTOLIC BLOOD PRESSURE: 66 MMHG | HEART RATE: 95 BPM | OXYGEN SATURATION: 98 %

## 2017-09-13 LAB
ANION GAP SERPL CALC-SCNC: 1 MMOL/L — LOW (ref 5–17)
BUN SERPL-MCNC: 29 MG/DL — HIGH (ref 7–23)
CALCIUM SERPL-MCNC: 9 MG/DL — SIGNIFICANT CHANGE UP (ref 8.4–10.5)
CHLORIDE SERPL-SCNC: 105 MMOL/L — SIGNIFICANT CHANGE UP (ref 96–108)
CO2 SERPL-SCNC: 36 MMOL/L — HIGH (ref 22–31)
CREAT SERPL-MCNC: 0.73 MG/DL — SIGNIFICANT CHANGE UP (ref 0.5–1.3)
GLUCOSE SERPL-MCNC: 86 MG/DL — SIGNIFICANT CHANGE UP (ref 70–99)
HCT VFR BLD CALC: 35.7 % — SIGNIFICANT CHANGE UP (ref 34.5–45)
HGB BLD-MCNC: 11.4 G/DL — LOW (ref 11.5–15.5)
MCHC RBC-ENTMCNC: 31.4 PG — SIGNIFICANT CHANGE UP (ref 27–34)
MCHC RBC-ENTMCNC: 32.1 GM/DL — SIGNIFICANT CHANGE UP (ref 32–36)
MCV RBC AUTO: 97.8 FL — SIGNIFICANT CHANGE UP (ref 80–100)
PLATELET # BLD AUTO: 317 K/UL — SIGNIFICANT CHANGE UP (ref 150–400)
POTASSIUM SERPL-MCNC: 4.2 MMOL/L — SIGNIFICANT CHANGE UP (ref 3.5–5.3)
POTASSIUM SERPL-SCNC: 4.2 MMOL/L — SIGNIFICANT CHANGE UP (ref 3.5–5.3)
RBC # BLD: 3.65 M/UL — LOW (ref 3.8–5.2)
RBC # FLD: 13.4 % — SIGNIFICANT CHANGE UP (ref 10.3–14.5)
SODIUM SERPL-SCNC: 142 MMOL/L — SIGNIFICANT CHANGE UP (ref 135–145)
WBC # BLD: 7.6 K/UL — SIGNIFICANT CHANGE UP (ref 3.8–10.5)
WBC # FLD AUTO: 7.6 K/UL — SIGNIFICANT CHANGE UP (ref 3.8–10.5)

## 2017-09-13 RX ADMIN — AZITHROMYCIN 255 MILLIGRAM(S): 500 TABLET, FILM COATED ORAL at 12:59

## 2017-09-13 RX ADMIN — Medication 0.5 MILLIGRAM(S): at 08:10

## 2017-09-13 RX ADMIN — Medication 3 MILLILITER(S): at 08:10

## 2017-09-13 RX ADMIN — ENOXAPARIN SODIUM 40 MILLIGRAM(S): 100 INJECTION SUBCUTANEOUS at 12:59

## 2017-09-13 RX ADMIN — Medication 3 MILLILITER(S): at 13:49

## 2017-09-13 RX ADMIN — Medication 20 MILLIGRAM(S): at 06:16

## 2017-09-13 NOTE — PROGRESS NOTE ADULT - PROBLEM SELECTOR PLAN 4
Gastrointestina stress ulcer prophylaxis l and DVT prophylaxis administrated

## 2017-09-13 NOTE — PROGRESS NOTE ADULT - PROBLEM SELECTOR PLAN 1
BD, Abx, cleared by Dr Hansen for d/c on tapering dose of steroids and po zithromycin
BD, Abx,pulm followup
BD, Abx,pulm followup

## 2017-09-13 NOTE — PROGRESS NOTE ADULT - NSHPATTENDINGPLANDISCUSS_GEN_ALL_CORE
med staff,social service ,Dr Hansen
Dr Hansen
MED STAFF,DR VELÁSQUEZ AND SW IN ER Spoke to the daughter at bedside

## 2017-09-13 NOTE — PROGRESS NOTE ADULT - ASSESSMENT
86 yo female admitted with copd exacerabtion ,acute on chronic bronchitis

## 2017-09-13 NOTE — PROGRESS NOTE ADULT - PROBLEM SELECTOR PLAN 3
continue current managmnet and medications had echo last admission -found to have normal LV systolic function and no valve disease,cleared by card for d/c

## 2017-09-13 NOTE — PROGRESS NOTE ADULT - PROBLEM SELECTOR PROBLEM 1
Chronic obstructive pulmonary disease with acute lower respiratory infection

## 2017-09-13 NOTE — PROGRESS NOTE ADULT - ATTENDING COMMENTS
Discharge summary prepared electronically
May be discharged to [facility] vs home with home care Daughter is present at bedside and requests placement in [facility] STAFF AND SOCIAL SERVICE

## 2017-09-13 NOTE — PROGRESS NOTE ADULT - SUBJECTIVE AND OBJECTIVE BOX
PROGRESS NOTE    OVERNIGHT  No new issues reported by medical staff . All above noted   SUBJECTIVE  Feels better,looking forward for rehab transfer  PAST MEDICAL & SURGICAL HISTORY:  Bronchiectasis  Gallstone  Pneumonia  Syncope  Herniation of Cervical Intervertebral Disc  Cardiomyopathy  COPD (Chronic Obstructive Pulmonary Disease)  History of Hysterectomy    HEALTH ISSUES - PROBLEM Dx:  Prophylactic measure: Prophylactic measure  Ischemic cardiomyopathy: Ischemic cardiomyopathy  Bronchiectasis with acute lower respiratory infection: Bronchiectasis with acute lower respiratory infection  Chronic obstructive pulmonary disease with acute lower respiratory infection: Chronic obstructive pulmonary disease with acute lower respiratory infection          MEDICATIONS  (STANDING):  ALBUTerol/ipratropium for Nebulization 3 milliLiter(s) Nebulizer every 6 hours  enoxaparin Injectable 40 milliGRAM(s) SubCutaneous daily  azithromycin  IVPB 500 milliGRAM(s) IV Intermittent every 24 hours  azithromycin  IVPB      buDESOnide   0.5 milliGRAM(s) Respule 0.5 milliGRAM(s) Inhalation two times a day  methylPREDNISolone sodium succinate Injectable 20 milliGRAM(s) IV Push daily    MEDICATIONS  (PRN):  guaiFENesin   Syrup  (Sugar-Free) 200 milliGRAM(s) Oral every 6 hours PRN Cough  acetaminophen   Tablet. 650 milliGRAM(s) Oral every 6 hours PRN Moderate Pain (4 - 6)  acetaminophen   Tablet 650 milliGRAM(s) Oral every 6 hours PRN For Temp greater than 38 C (100.4 F)      OBJECTIVE    T(C): 36.7 (09-13-17 @ 09:41), Max: 37.5 (09-12-17 @ 22:28)  HR: 96 (09-13-17 @ 09:41) (83 - 96)  BP: 124/98 (09-13-17 @ 09:41) (100/57 - 124/98)  RR: 18 (09-13-17 @ 09:41) (16 - 18)  SpO2: 95% (09-13-17 @ 09:41) (95% - 99%)  Wt(kg): --  I&O's Summary    12 Sep 2017 07:01  -  13 Sep 2017 07:00  --------------------------------------------------------  IN: 0 mL / OUT: 600 mL / NET: -600 mL          REVIEW OF SYSTEMS:  CONSTITUTIONAL: No fever, weight loss, or fatigue  EYES: No eye pain, visual disturbances, or discharge  ENMT:  No difficulty hearing, tinnitus, vertigo; No sinus or throat pain  NECK: No pain or stiffness  BREASTS: No pain, masses, or nipple discharge  RESPIRATORY: mild  cough, no wheezing, chills or hemoptysis; No shortness of breath  CARDIOVASCULAR: No chest pain, palpitations, dizziness, or leg swelling  GASTROINTESTINAL: No abdominal pain. No nausea, vomiting; No diarrhea or constipation. No melena or hematochezia.  GENITOURINARY: No dysuria, frequency, hematuria, or incontinence  NEUROLOGICAL: No headaches, memory loss, loss of strength, numbness, or tremors  SKIN: No itching, burning, rashes, or lesions   LYMPH NODES: No enlarged glands  MUSCULOSKELETAL: No joint pain or swelling; No muscle, back, or extremity pain  HEME/LYMPH: No easy bruising, or bleeding gums  ALLERY AND IMMUNOLOGIC: No hives or eczema      PHYSICAL EXAM:  Appearance: NAD.   HEENT:   Moist oral mucosa. Conjunctiva clear b/l.   Neck : supple  Cardiovascular: RRR ,S1S2 present  Respiratory: Lungs CTAB.  Gastrointestinal:  Soft, nontender. No rebound. No rigidity. BS present	  Skin: No rashes, No ecchymoses, No cyanosis.	  Neurologic: Non-focal. Moving all extremities. Following commands.  Extremities: No edema. No erythema. No calf tenderness.  	  LABS:                        11.4   7.6   )-----------( 317      ( 13 Sep 2017 07:29 )             35.7     09-13    142  |  105  |  29<H>  ----------------------------<  86  4.2   |  36<H>  |  0.73    Ca    9.0      13 Sep 2017 07:29            RADIOLOGY & ADDITIONAL TESTS:.el     ASSESSMENT/PLAN:

## 2017-09-13 NOTE — PROGRESS NOTE ADULT - PROBLEM SELECTOR PROBLEM 2
Bronchiectasis with acute lower respiratory infection

## 2017-09-13 NOTE — PROGRESS NOTE ADULT - PROBLEM SELECTOR PLAN 2
continue current managmnet and medications
Admitted  for antibacterial managmnet ,nebulised bronchodilators ,iv steroids and pulmonology evaluation,O2 supply,serial labs and chest xrays
Admitted  for antibacterial managmnet ,nebulised bronchodilators ,iv steroids and pulmonology evaluation,O2 supply,serial labs and chest xrays

## 2017-09-13 NOTE — PROGRESS NOTE ADULT - SUBJECTIVE AND OBJECTIVE BOX
Patient seen and examined today Plan discussed/Questions answered  (with patient/ancillary staff/colleagues) Chart notes reviewd More detailed Pulm/Critical Care notes, assessment and plan  will be added later today as needed after reviewing further labs as they become available     Notable interval events reviewed    ROS/PE  . REVIEW OF SYMPTOMS    Able to give ROS  Yes     RELIABLE NO   Weakness Yes   Chills No   Vision changes No  Lymph nodes No enlarged glands   Endocrine No unexplained hair loss No het or cold intolerance   Allergy No hives   Sore throat No   Coughing blood No  Headache No  Confusion YES  Chest pain No  Palpitations No   Pain abdomen NO   Shortness of breath YES  Vomiting NO  Pain neck No  Pain abdomen NO     PHYSICAL EXAM    HEENT Unremarkable PERRLA atraumatic  RESP Fair air entry EXP prolonged    Harsh breath sound Resp distres mild  CARDIAC S1 S2 No S3     NO JVD   Awake Alert and ORIENTED x tw  ABDOMEN SOFT BS PRESENT NOT DISTENDED No hepatosplenomegaly  PEDAL EDEMA present No calf tenderness  NO rash GENERAL Not TOXIC looking  . Patient seen and examined today Plan discussed/Questions answered  (with patient/ancillary staff/colleagues) Chart notes reviewd More detailed Pulm/Critical Care notes, assessment and plan  will be added later today as needed after reviewing further labs as they become available     Notable interval events reviewed    ROS/PE  . REVIEW OF SYMPTOMS    Able to give ROS  Yes     RELIABLE NO   Weakness Yes   Chills No   Vision changes No  Lymph nodes No enlarged glands   Endocrine No unexplained hair loss No het or cold intolerance   Allergy No hives   Sore throat No   Coughing blood No  Headache No  Confusion YES  Chest pain No  Palpitations No   Pain abdomen NO   Shortness of breath YES  Vomiting NO  Pain neck No  Pain abdomen NO     PHYSICAL EXAM    HEENT Unremarkable PERRLA atraumatic  RESP Fair air entry EXP prolonged    Harsh breath sound Resp distres mild  CARDIAC S1 S2 No S3     NO JVD   Awake Alert and ORIENTED x tw  ABDOMEN SOFT BS PRESENT NOT DISTENDED No hepatosplenomegaly  PEDAL EDEMA present No calf tenderness    VITALS/LABS  9/13/2017 W 7.6 Hb 11.4 Plt 317 Na 142 K 4.2 CO2 36 Cr .7   9/12/2017 afeb 89 104/45 17 95%   9/12/2017 W 11.1 Hb 11.2 Plt 333 Na 141 K 4.5 CO2 33   PATIENT DESCRIPTION HOSPITAL COURSE PLAN 9/10 ADMISSION [facility]   86 yo female nonsmoker sees Dr Loretta HERNANDES passive smoking () has home nebulizer Uses brovana No HO MI Baseline does not drive (out of choice) had been sick x 2 w pta 8/29/2017 Rxed with zpak then levaquin without relief with yellow phlegm No fever no chest pain no hemoptsyis   [facility]mitted [facility] 8/29-8/31/2017 with poss pneumonia COPD ex PNEUMONIA 8/29/2017 CXR R perihilar pneumonia  Rxed Azithro (8/29) aztreonam (8/29) Pct was .08 (8/30) BC neg 8/30  Abio changed to doxy by Dr Vasquez 8/30)   PROMINENT R HILUM  8/29 CT ch showed mainly chr looking changes 8/29/2017 CT Ch 1) Bectasis rul 2) Cyst Bectasis rml which appears collapsed 3) tib rll 4) calcified granuloma rul 5) Mini effsn or pl thickening r base 6) pleuroparenchymal scarring apices r greater   ECHO 8/30 N LVSF RVSP 35 COPD  duoneb.4 (8/29) Steroids added RO VTE  V duplex  8/29 n   Patient presented to [facility] ER 9/10 with weakness SOB nonproductive cough BARRIOS admitted [facility] 9/10 with COPD EX Resp distress  ER VITALS 9/10/2017 7a afeb 106 120/70 90%   ER MEDS GIVEN BEFORE 9/10 11 A Solumed 125 NS 1l bolus Duoneb   DC MEDS 8/31/2017 Prednisone Taper Pulmicort doxycycline prilosec   PROBLEM ASSESSMENT PLAN   RESP   9/10 RA 90% 9/11 RA 93%   9/10 3l 738/48/84   LEUKOCYTOSIS RESP TRACT INFECTION POSSIBLE UTI  Azithro (9/10)   9/10-9/11-9/12  W 15.8-14.1-11.1   9/11 pct n 9/10 bc n   9/10 Influenza rapid screen n  9/11 RVP n   9/10 UA 1025 Lge bld Over 50 R Mod bact W 11-25   9/10 Leukocytosis may be 2/2 recent steroid taper   COPD EX  BD Steroids Solumed 20 (9/10)   RO VTE   9/10 V duplex n   HYPERKALEMIA RESOLVED  9/10-9/11 K 5.6-4.5 CR .8   Monitor serially Repeat   9/12/2017 Pul status improved May need rehab placement   9/13 May DC on po steroids and BD   GLOBAL ISSUE/BEST PRACTICE:        PROBLEM:      Analgesia:     na                        PROBLEM: Sedation:     na               PROBLEM: HOB elevation:   y             PROBLEM: Stress ulcer proph:    na                      PROBLEM: VTE prophylaxis:      lvnx 40 (9/10) (Pt refusing 9/10)                   PROBLEM: Glycemic control:    na   PROBLEM: Nutrition:    moore  (9/10)           PROBLEM: Advanced directive: na     PROBLEM: Allergies:  na  TIME SPENT Over 25 minutes aggregate care time spent on encounter; activities included   direct patient care, counseling and/or coordinating care reviewing notes, lab data/ imaging , discussion with multidisciplinary team/ patient  /family. Risks, benefits, alternatives  discussed in detail. Questions/concerns  were addressed  to the best of my ability .    NO rash GENERAL Not TOXIC looking  .

## 2017-10-07 ENCOUNTER — EMERGENCY (EMERGENCY)
Facility: HOSPITAL | Age: 82
LOS: 1 days | End: 2017-10-07
Attending: STUDENT IN AN ORGANIZED HEALTH CARE EDUCATION/TRAINING PROGRAM | Admitting: STUDENT IN AN ORGANIZED HEALTH CARE EDUCATION/TRAINING PROGRAM
Payer: MEDICARE

## 2017-10-07 VITALS — OXYGEN SATURATION: 88 % | WEIGHT: 139.99 LBS | HEIGHT: 65 IN

## 2017-10-07 PROCEDURE — 92950 HEART/LUNG RESUSCITATION CPR: CPT

## 2017-10-07 PROCEDURE — 99285 EMERGENCY DEPT VISIT HI MDM: CPT | Mod: 25

## 2017-10-07 NOTE — ED ADULT NURSE REASSESSMENT NOTE - NS ED NURSE REASSESS COMMENT FT1
Post-mortem care provided, family brought to room to be with pt. Daughter, son and son-in-law provided comfort and support, questions answered. Time allowed to spend with mother.

## 2017-10-07 NOTE — ED PROVIDER NOTE - CARE PLAN
Principal Discharge DX:	Cardiac arrest Principal Discharge DX:	Cardiac arrest  Secondary Diagnosis:	PNA (pneumonia)

## 2017-10-07 NOTE — ED PROVIDER NOTE - PROGRESS NOTE DETAILS
Discussed with daughter, son, and son-in-law.  PMD Dr. Burgos.  Daughter states patient acquired PNA at last hospital admission, was short of breath yesterday Discussed with daughter, son, and son-in-law.  PMD Dr. Burgos.  Daughter states patient acquired PNA at last hospital admission, was short of breath yesterday. Epi x 2 in ED, CPR continued, US showed no cardiac motion

## 2017-10-07 NOTE — ED ADULT NURSE REASSESSMENT NOTE - NS ED NURSE REASSESS COMMENT FT1
Resuscitative efforts unsuccessful and pt was pronounced dead at 0332. Family spoken to by MD Mccallum and events of the evening explained, questions answered.

## 2017-10-07 NOTE — ED ADULT NURSE NOTE - OBJECTIVE STATEMENT
Pt brought to the ED in cardiac arrest by Deidre PARSONS. Pt is intubated, has NS infusing through left leg IO, Pierce 3 delivering chest compressions. ACLS protocol was initiated at scene and continued in the ED. No measurable VS.

## 2017-10-07 NOTE — ED PROVIDER NOTE - OBJECTIVE STATEMENT
87 year old female with a history of PNA, COPD, cardiomyopathy presents in cardiac arrest.  Patient resides at rehab facility.  She was last seen alive by staff at 1:30am.  At 2:30am, nurse noted patient to be apneic and pulseless. CPR initiated.  EMS arrived at 2:40, patient intubated, IO placed. epi x 2 and bicarb given. CPR continued. Presenting rhythm was asystole.  They noted copious dark/bloody secretions upon intubation.  Daughter had noticed yesterday afternoon that patient was short of breath. PMD Dr. Burgos

## 2017-10-19 PROCEDURE — 97530 THERAPEUTIC ACTIVITIES: CPT

## 2017-10-19 PROCEDURE — 87899 AGENT NOS ASSAY W/OPTIC: CPT

## 2017-10-19 PROCEDURE — 97161 PT EVAL LOW COMPLEX 20 MIN: CPT

## 2017-10-19 PROCEDURE — 71045 X-RAY EXAM CHEST 1 VIEW: CPT

## 2017-10-19 PROCEDURE — 99285 EMERGENCY DEPT VISIT HI MDM: CPT

## 2017-10-19 PROCEDURE — 87449 NOS EACH ORGANISM AG IA: CPT

## 2017-10-19 PROCEDURE — 93306 TTE W/DOPPLER COMPLETE: CPT

## 2017-10-19 PROCEDURE — 94640 AIRWAY INHALATION TREATMENT: CPT

## 2017-10-19 PROCEDURE — 85027 COMPLETE CBC AUTOMATED: CPT

## 2017-10-19 PROCEDURE — 94664 DEMO&/EVAL PT USE INHALER: CPT

## 2017-10-19 PROCEDURE — 96365 THER/PROPH/DIAG IV INF INIT: CPT

## 2017-10-19 PROCEDURE — 81001 URINALYSIS AUTO W/SCOPE: CPT

## 2017-10-19 PROCEDURE — 87633 RESP VIRUS 12-25 TARGETS: CPT

## 2017-10-19 PROCEDURE — 87040 BLOOD CULTURE FOR BACTERIA: CPT

## 2017-10-19 PROCEDURE — 83735 ASSAY OF MAGNESIUM: CPT

## 2017-10-19 PROCEDURE — 87486 CHLMYD PNEUM DNA AMP PROBE: CPT

## 2017-10-19 PROCEDURE — 87798 DETECT AGENT NOS DNA AMP: CPT

## 2017-10-19 PROCEDURE — 36600 WITHDRAWAL OF ARTERIAL BLOOD: CPT

## 2017-10-19 PROCEDURE — 36415 COLL VENOUS BLD VENIPUNCTURE: CPT

## 2017-10-19 PROCEDURE — 82607 VITAMIN B-12: CPT

## 2017-10-19 PROCEDURE — 87581 M.PNEUMON DNA AMP PROBE: CPT

## 2017-10-19 PROCEDURE — 80053 COMPREHEN METABOLIC PANEL: CPT

## 2017-10-19 PROCEDURE — 93970 EXTREMITY STUDY: CPT

## 2017-10-19 PROCEDURE — 71250 CT THORAX DX C-: CPT

## 2017-10-19 PROCEDURE — 82803 BLOOD GASES ANY COMBINATION: CPT

## 2017-10-19 PROCEDURE — 80048 BASIC METABOLIC PNL TOTAL CA: CPT

## 2017-10-19 PROCEDURE — 84145 PROCALCITONIN (PCT): CPT

## 2017-10-19 PROCEDURE — 85730 THROMBOPLASTIN TIME PARTIAL: CPT

## 2017-10-19 PROCEDURE — 94760 N-INVAS EAR/PLS OXIMETRY 1: CPT

## 2017-10-19 PROCEDURE — 96374 THER/PROPH/DIAG INJ IV PUSH: CPT

## 2017-10-19 PROCEDURE — 84100 ASSAY OF PHOSPHORUS: CPT

## 2017-10-19 PROCEDURE — 85610 PROTHROMBIN TIME: CPT

## 2017-10-19 PROCEDURE — 96375 TX/PRO/DX INJ NEW DRUG ADDON: CPT

## 2017-10-19 PROCEDURE — 87086 URINE CULTURE/COLONY COUNT: CPT

## 2017-10-19 PROCEDURE — 99285 EMERGENCY DEPT VISIT HI MDM: CPT | Mod: 25

## 2017-10-19 PROCEDURE — 80061 LIPID PANEL: CPT

## 2017-10-19 PROCEDURE — 93005 ELECTROCARDIOGRAM TRACING: CPT

## 2017-10-19 PROCEDURE — 87400 INFLUENZA A/B EACH AG IA: CPT

## 2017-10-19 PROCEDURE — 83605 ASSAY OF LACTIC ACID: CPT

## 2017-10-19 PROCEDURE — 97116 GAIT TRAINING THERAPY: CPT

## 2017-10-19 PROCEDURE — 97110 THERAPEUTIC EXERCISES: CPT

## 2017-10-19 PROCEDURE — 82248 BILIRUBIN DIRECT: CPT

## 2018-07-16 PROBLEM — J44.9 CHRONIC OBSTRUCTIVE PULMONARY DISEASE, UNSPECIFIED: Chronic | Status: INACTIVE | Noted: 2017-08-29 | Resolved: 2017-09-10

## 2018-10-23 NOTE — DISCHARGE NOTE ADULT - PATIENT PORTAL LINK FT
change in size/color of mole
“You can access the [facility] Patient Portal, offered by [facility], by registering with the following website: [email or web address]”

## 2019-07-11 NOTE — ED ADULT NURSE NOTE - PT NEEDS ASSIST
Neal called and stated that Janae Brunson no

## 2020-10-23 NOTE — PATIENT PROFILE ADULT. - ..
Addended by: GISELL MARX on: 10/23/2020 02:53 PM     Modules accepted: Orders    
Addended by: SAVI RAUSCH on: 10/23/2020 02:47 PM     Modules accepted: Orders    
22-Jan-2015 17:41:57

## 2020-10-30 NOTE — ED PROVIDER NOTE - CROS ED ENMT ALL NEG
negative... I have personally performed a face to face diagnostic evaluation on this patient. I have reviewed the ACP note and agree with the history, exam and plan of care, except as noted.

## 2020-12-28 NOTE — PHYSICAL THERAPY INITIAL EVALUATION ADULT - LEVEL OF INDEPENDENCE: SIT/STAND, REHAB EVAL
Please let pt know medication refilled, due for a visit please assist with an appt with me
contact guard

## 2021-03-26 NOTE — ED ADULT TRIAGE NOTE - CCCP TRG CHIEF CMPLNT
[No change from previous assessment] : No change from previous assessment [Patient prepared for dive] : Patient prepared for dive pain, facial [Patient descended without problem for 9 minutes] : Patient descended without problem for 9 minutes [No dizziness or thirst] :  No dizziness or thirst [No ear problems] : No ear problems [Vital signs stable] : Vital signs stable [Tolerating dive well] : Tolerating dive well [No Chest Pain, shortness of breath] : No Chest Pain, shortness of breath [Respiratory Rate Stable] : Respiratory Rate Stable [No chest pain, shortness of breath, or ear pain] :  No chest pain, shortness of breath, or ear pain  [Tolerated Ascent well] : Tolerated Ascent well [Vital Signs stable] : Vital Signs stable [A physician was present throughout the entire HBOT] : A physician was present throughout the entire HBOT [No] : No [Clinically Stable] : Clinically stable [Continue Treatment Plan] : Continue treatment plan [0] : 0 out of 10

## 2022-03-04 NOTE — ED ADULT NURSE NOTE - CAS TRG GENERAL NORM CIRC DET
Detail Level: Detailed Depth Of Biopsy: dermis Was A Bandage Applied: Yes Size Of Lesion In Cm: 0.3 X Size Of Lesion In Cm: 0 Biopsy Type: H and E Biopsy Method: Dermablade Anesthesia Type: 1% lidocaine with epinephrine 1:100,000 buffered with 8.4% sodium bicarbonate (1:9 ratio) Anesthesia Volume In Cc (Will Not Render If 0): 0.5 Hemostasis: Aluminum Chloride and Electrocautery Wound Care: Petrolatum Dressing: bandage Destruction After The Procedure: No Type Of Destruction Used: Curettage Curettage Text: The wound bed was treated with curettage after the biopsy was performed. Cryotherapy Text: The wound bed was treated with cryotherapy after the biopsy was performed. Electrodesiccation Text: The wound bed was treated with electrodesiccation after the biopsy was performed. Electrodesiccation And Curettage Text: The wound bed was treated with electrodesiccation and curettage after the biopsy was performed. Silver Nitrate Text: The wound bed was treated with silver nitrate after the biopsy was performed. Lab: 6 Lab Facility: 3 Consent: Written consent was obtained and risks were reviewed including but not limited to scarring, infection, bleeding, scabbing, incomplete removal, nerve damage and allergy to anesthesia. Post-Care Instructions: I reviewed with the patient in detail post-care instructions. Patient is to apply vaseline to bx site and cover with a bandage daily.  OK to wash with soap and water.  Prefer no soaking or swimming until healed. Strong peripheral pulses Notification Instructions: Patient will be notified of biopsy results. However, patient instructed to call the office if not contacted within 2 weeks. Billing Type: Third-Party Bill Information: Selecting Yes will display possible errors in your note based on the variables you have selected. This validation is only offered as a suggestion for you. PLEASE NOTE THAT THE VALIDATION TEXT WILL BE REMOVED WHEN YOU FINALIZE YOUR NOTE. IF YOU WANT TO FAX A PRELIMINARY NOTE YOU WILL NEED TO TOGGLE THIS TO 'NO' IF YOU DO NOT WANT IT IN YOUR FAXED NOTE.

## 2022-05-29 NOTE — H&P ADULT - PSH
History of Hysterectomy
CONSTITUTIONAL:  see HPI  SKIN:  no skin rash  ENMT: no neck pain or stiffness  CARD:  + chest pain  RESP:  no cough or respiratory distress  ABD:  no abdominal pain, nausea, vomiting, or diarrhea  MSK:  no back pain  NEURO:  no headache   Except as documented in the HPI,  all other systems are negative

## 2022-08-12 NOTE — DIETITIAN INITIAL EVALUATION ADULT. - PROBLEM SELECTOR PLAN 3
unchanged
Admitted for septic workup and evaluation,send blood and urine cx,serial lactate levels,monitor vitals closley,ivfs hydration,monitor urine output and renal profile,iv abx initiated ( azactam) id cons called

## 2022-09-30 NOTE — PHYSICAL THERAPY INITIAL EVALUATION ADULT - TRANSFER SKILLS, REHAB EVAL
[Alert] : alert [Well Nourished] : well nourished [No Acute Distress] : no acute distress [Well Developed] : well developed [Normal Sclera/Conjunctiva] : normal sclera/conjunctiva [EOMI] : extra ocular movement intact [No Proptosis] : no proptosis [Normal Oropharynx] : the oropharynx was normal [Thyroid Not Enlarged] : the thyroid was not enlarged [No Thyroid Nodules] : no palpable thyroid nodules [No Respiratory Distress] : no respiratory distress [No Accessory Muscle Use] : no accessory muscle use [Clear to Auscultation] : lungs were clear to auscultation bilaterally [Normal S1, S2] : normal S1 and S2 [Normal Rate] : heart rate was normal [Regular Rhythm] : with a regular rhythm independent [No Edema] : no peripheral edema [Pedal Pulses Normal] : the pedal pulses are present [Normal Bowel Sounds] : normal bowel sounds [Not Tender] : non-tender [Not Distended] : not distended [Soft] : abdomen soft [Normal Anterior Cervical Nodes] : no anterior cervical lymphadenopathy [Normal Posterior Cervical Nodes] : no posterior cervical lymphadenopathy [No Spinal Tenderness] : no spinal tenderness [Spine Straight] : spine straight [No Stigmata of Cushings Syndrome] : no stigmata of Cushings Syndrome [Normal Gait] : normal gait [Normal Strength/Tone] : muscle strength and tone were normal [No Rash] : no rash [Normal Reflexes] : deep tendon reflexes were 2+ and symmetric [No Tremors] : no tremors [Oriented x3] : oriented to person, place, and time [Acanthosis Nigricans] : no acanthosis nigricans

## 2022-10-04 NOTE — CONSULT NOTE ADULT - PROBLEM SELECTOR PROBLEM 1
Discussed with the spouse. She checked her cabinet at home and had Carvedilol 12.5 mg tabs from 2021. Per Dr. Silverman have patient take the new prescription Toprol XL 50 mg as discussed yesterday. Patient's wife notified. Wife was notified that there were no other changes to the patient's medications and he should continue his medications as he was prior to the procedure.     
Dr. Silverman please advise no op note available at this time.  
Miranda discussed these medications with patient's wife yesterday.  I will have her call patient for clarification.    William Silverman MD    
Onychomycosis

## 2022-11-15 NOTE — PROGRESS NOTE ADULT - SUBJECTIVE AND OBJECTIVE BOX
PROGRESS NOTE    OVERNIGHT No new issues reported by medical staff . All above noted   Had anxiety episode last night   SUBJECTIVE Patient is resting in a bed comfortably  .No distress noted Denies sob ,cp ,completed breakfast Off oxygen     PAST MEDICAL & SURGICAL HISTORY:  Bronchiectasis  Gallstone  Pneumonia  Syncope  Herniation of Cervical Intervertebral Disc  Cardiomyopathy  COPD (Chronic Obstructive Pulmonary Disease)  History of Hysterectomy    HEALTH ISSUES - PROBLEM Dx:  Ischemic cardiomyopathy: Ischemic cardiomyopathy  Bronchiectasis with acute lower respiratory infection: Bronchiectasis with acute lower respiratory infection  Chronic obstructive pulmonary disease with acute lower respiratory infection: Chronic obstructive pulmonary disease with acute lower respiratory infection          MEDICATIONS  (STANDING):  ALBUTerol/ipratropium for Nebulization 3 milliLiter(s) Nebulizer every 6 hours  enoxaparin Injectable 40 milliGRAM(s) SubCutaneous daily  azithromycin  IVPB 500 milliGRAM(s) IV Intermittent every 24 hours  azithromycin  IVPB      buDESOnide   0.5 milliGRAM(s) Respule 0.5 milliGRAM(s) Inhalation two times a day  methylPREDNISolone sodium succinate Injectable 20 milliGRAM(s) IV Push daily  influenza   Vaccine 0.5 milliLiter(s) IntraMuscular once    MEDICATIONS  (PRN):  guaiFENesin   Syrup  (Sugar-Free) 200 milliGRAM(s) Oral every 6 hours PRN Cough  acetaminophen   Tablet. 650 milliGRAM(s) Oral every 6 hours PRN Moderate Pain (4 - 6)  acetaminophen   Tablet 650 milliGRAM(s) Oral every 6 hours PRN For Temp greater than 38 C (100.4 F)      OBJECTIVE    T(C): 36.7 (17 @ 07:00), Max: 37.1 (09-10-17 @ 21:36)  HR: 84 (17 @ 07:44) (78 - 96)  BP: 111/44 (17 @ 07:00) (97/50 - 129/52)  RR: 23 (17 @ 07:00) (18 - 24)  SpO2: 96% (17 @ 07:44) (96% - 100%)  Wt(kg): --  I&O's Summary        REVIEW OF SYSTEMS:  CONSTITUTIONAL: No fever, weight loss, or fatigue  EYES: No eye pain, visual disturbances, or discharge  ENMT:  No difficulty hearing, tinnitus, vertigo; No sinus or throat pain  NECK: No pain or stiffness  BREASTS: No pain, masses, or nipple discharge  RESPIRATORY: No cough, wheezing, chills or hemoptysis; No shortness of breath  CARDIOVASCULAR: No chest pain, palpitations, dizziness, or leg swelling  GASTROINTESTINAL: No abdominal pain. No nausea, vomiting; No diarrhea or constipation. No melena or hematochezia.  GENITOURINARY: No dysuria, frequency, hematuria, or incontinence  NEUROLOGICAL: No headaches, memory loss, loss of strength, numbness, or tremors  SKIN: No itching, burning, rashes, or lesions   LYMPH NODES: No enlarged glands  MUSCULOSKELETAL: No joint pain or swelling; No muscle, back, or extremity pain  HEME/LYMPH: No easy bruising, or bleeding gums  ALLERY AND IMMUNOLOGIC: No hives or eczema      PHYSICAL EXAM:  Appearance: NAD.   HEENT:   Moist oral mucosa. Conjunctiva clear b/l.   Neck : supple  Cardiovascular: RRR ,S1S2 present  Respiratory: Lungs CTAB.  Gastrointestinal:  Soft, nontender. No rebound. No rigidity. BS present	  Skin: No rashes, No ecchymoses, No cyanosis.	  Neurologic: Non-focal. Moving all extremities. Following commands.  Extremities: No edema. No erythema. No calf tenderness.  	  LABS:                        11.2   14.1  )-----------( 319      ( 11 Sep 2017 06:30 )             35.1     -11    140  |  107  |  29<H>  ----------------------------<  87  4.5   |  28  |  0.86    Ca    9.0      11 Sep 2017 06:30  Phos  3.9       Mg     2.1         TPro  6.7  /  Alb  2.6<L>  /  TBili  0.2  /  DBili  0.0  /  AST  17  /  ALT  15  /  AlkPhos  108  09-11    PT/INR - ( 11 Sep 2017 06:30 )   PT: 10.7 sec;   INR: 0.98 ratio         PTT - ( 10 Sep 2017 08:09 )  PTT:31.0 sec  Urinalysis Basic - ( 10 Sep 2017 14:17 )    Color: Yellow / Appearance: Clear / S.025 / pH: x  Gluc: x / Ketone: Negative  / Bili: Negative / Urobili: 1 mg/dL   Blood: x / Protein: 100 mg/dL / Nitrite: Negative   Leuk Esterase: Small / RBC: >50 /HPF / WBC 11-25   Sq Epi: x / Non Sq Epi: Few / Bacteria: Moderate        RADIOLOGY & ADDITIONAL TESTS:    ASSESSMENT/PLAN: None

## 2023-03-06 NOTE — ED ADULT NURSE NOTE - CAS TRG GENERAL NORM CIRC DET
No
NONE
pressure ulcer/PT wound care initial evaluation performed with all wounds documented in flowsheet 2 4.0 A&I. Healed wounds on lateral knee and hip following ORIF.

## 2023-05-05 NOTE — ED PROVIDER NOTE - CPE EDP SKIN NORM
Lm for patient to CB and schedule an appointment before her next refill.  90 day supply was given normal...

## 2024-09-20 NOTE — DIETITIAN INITIAL EVALUATION ADULT. - PERTINENT LABORATORY DATA
glucose 149 Alb 3.2 Detail Level: Detailed Depth Of Biopsy: dermis Was A Bandage Applied: Yes Size Of Lesion In Cm: 0 Biopsy Type: H and E Biopsy Method: Dermablade Anesthesia Type: 2% lidocaine with epinephrine and a 1:10 solution of 8.4% sodium bicarbonate Anesthesia Volume In Cc: 1 Hemostasis: Aluminum Chloride Wound Care: Petrolatum Dressing: no dressing applied Destruction After The Procedure: No Type Of Destruction Used: Curettage Curettage Text: The wound bed was treated with curettage after the biopsy was performed. Cryotherapy Text: The wound bed was treated with cryotherapy after the biopsy was performed. Electrodesiccation Text: The wound bed was treated with electrodesiccation after the biopsy was performed. Electrodesiccation And Curettage Text: The wound bed was treated with electrodesiccation and curettage after the biopsy was performed. Silver Nitrate Text: The wound bed was treated with silver nitrate after the biopsy was performed. Lab: [id number] Consent: Written consent was obtained and risks were reviewed including but not limited to scarring, infection, bleeding, scabbing, incomplete removal, nerve damage and allergy to anesthesia. Post-Care Instructions: I reviewed with the patient in detail post-care instructions. Patient is to keep the biopsy site dry overnight, and then apply Vaseline or Aquaphor ointment or Polysporin twice daily until healed. Patient instructed to call office with any concerns. Notification Instructions: Patient will be notified of biopsy results. However, patient instructed to call the office if not contacted within 2 weeks. Billing Type: Third-Party Bill Information: Selecting Yes will display possible errors in your note based on the variables you have selected. This validation is only offered as a suggestion for you. PLEASE NOTE THAT THE VALIDATION TEXT WILL BE REMOVED WHEN YOU FINALIZE YOUR NOTE. IF YOU WANT TO FAX A PRELIMINARY NOTE YOU WILL NEED TO TOGGLE THIS TO 'NO' IF YOU DO NOT WANT IT IN YOUR FAXED NOTE.

## 2024-11-15 NOTE — PATIENT PROFILE ADULT. - NSSUBSTANCEUSE_GEN_ALL_CORE_SD
[FreeTextEntry1] : Patient with upper endoscopy revealing an area of bleeding proximal to the anastomosis.  The anastomosis was also friable.  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  Her last hemoglobin was 9.1.  FOBT was negative.  She will continue sucralfate.  Omeprazole will be increased to 20 mg twice daily.  Zenpep will be stopped.  She is seeing hematology in 2 weeks and follow-up. never used